# Patient Record
Sex: MALE | Race: BLACK OR AFRICAN AMERICAN | NOT HISPANIC OR LATINO | Employment: UNEMPLOYED | URBAN - METROPOLITAN AREA
[De-identification: names, ages, dates, MRNs, and addresses within clinical notes are randomized per-mention and may not be internally consistent; named-entity substitution may affect disease eponyms.]

---

## 2017-01-06 ENCOUNTER — OFFICE VISIT - HEALTHEAST (OUTPATIENT)
Dept: INTERNAL MEDICINE | Facility: CLINIC | Age: 62
End: 2017-01-06

## 2017-01-06 DIAGNOSIS — M54.12 CERVICAL RADICULOPATHY: ICD-10-CM

## 2017-01-06 DIAGNOSIS — Z72.0 TOBACCO ABUSE: ICD-10-CM

## 2017-01-06 DIAGNOSIS — M19.90 OSTEOARTHRITIS: ICD-10-CM

## 2017-01-06 DIAGNOSIS — B18.2 CHRONIC HEPATITIS C (H): ICD-10-CM

## 2017-01-06 DIAGNOSIS — Z00.00 ROUTINE GENERAL MEDICAL EXAMINATION AT A HEALTH CARE FACILITY: ICD-10-CM

## 2017-01-06 DIAGNOSIS — E11.9 TYPE 2 DIABETES MELLITUS (H): ICD-10-CM

## 2017-01-06 DIAGNOSIS — K21.9 GERD (GASTROESOPHAGEAL REFLUX DISEASE): ICD-10-CM

## 2017-01-06 DIAGNOSIS — R20.2 PARESTHESIA OF RIGHT FOOT: ICD-10-CM

## 2017-01-06 DIAGNOSIS — F32.A DEPRESSION: ICD-10-CM

## 2017-01-06 LAB
CHOLEST SERPL-MCNC: 234 MG/DL
FASTING STATUS PATIENT QL REPORTED: YES
HBA1C MFR BLD: 5.3 % (ref 3.5–6)
HDLC SERPL-MCNC: 76 MG/DL
LDLC SERPL CALC-MCNC: 135 MG/DL
PSA SERPL-MCNC: 5.2 NG/ML (ref 0–4.5)
TRIGL SERPL-MCNC: 113 MG/DL

## 2017-01-06 ASSESSMENT — MIFFLIN-ST. JEOR: SCORE: 1457.11

## 2017-01-09 ENCOUNTER — COMMUNICATION - HEALTHEAST (OUTPATIENT)
Dept: INTERNAL MEDICINE | Facility: CLINIC | Age: 62
End: 2017-01-09

## 2017-03-03 ENCOUNTER — COMMUNICATION - HEALTHEAST (OUTPATIENT)
Dept: INTERNAL MEDICINE | Facility: CLINIC | Age: 62
End: 2017-03-03

## 2017-03-09 ENCOUNTER — OFFICE VISIT - HEALTHEAST (OUTPATIENT)
Dept: INTERNAL MEDICINE | Facility: CLINIC | Age: 62
End: 2017-03-09

## 2017-03-09 DIAGNOSIS — M25.512 ACUTE PAIN OF LEFT SHOULDER: ICD-10-CM

## 2017-03-09 DIAGNOSIS — M79.604 RIGHT LEG PAIN: ICD-10-CM

## 2017-03-09 ASSESSMENT — MIFFLIN-ST. JEOR: SCORE: 1466.18

## 2017-03-17 ENCOUNTER — COMMUNICATION - HEALTHEAST (OUTPATIENT)
Dept: SCHEDULING | Facility: CLINIC | Age: 62
End: 2017-03-17

## 2017-03-17 ENCOUNTER — COMMUNICATION - HEALTHEAST (OUTPATIENT)
Dept: INTERNAL MEDICINE | Facility: CLINIC | Age: 62
End: 2017-03-17

## 2017-03-17 ENCOUNTER — AMBULATORY - HEALTHEAST (OUTPATIENT)
Dept: INTERNAL MEDICINE | Facility: CLINIC | Age: 62
End: 2017-03-17

## 2017-03-17 DIAGNOSIS — M79.604 RIGHT LEG PAIN: ICD-10-CM

## 2017-03-21 ENCOUNTER — HOSPITAL ENCOUNTER (OUTPATIENT)
Dept: MRI IMAGING | Facility: CLINIC | Age: 62
Discharge: HOME | End: 2017-03-21
Attending: INTERNAL MEDICINE

## 2017-03-21 DIAGNOSIS — M79.604 RIGHT LEG PAIN: ICD-10-CM

## 2017-03-23 ENCOUNTER — COMMUNICATION - HEALTHEAST (OUTPATIENT)
Dept: INTERNAL MEDICINE | Facility: CLINIC | Age: 62
End: 2017-03-23

## 2017-03-23 ENCOUNTER — AMBULATORY - HEALTHEAST (OUTPATIENT)
Dept: INTERNAL MEDICINE | Facility: CLINIC | Age: 62
End: 2017-03-23

## 2017-03-23 DIAGNOSIS — M79.604 RIGHT LEG PAIN: ICD-10-CM

## 2017-04-05 ENCOUNTER — OFFICE VISIT - HEALTHEAST (OUTPATIENT)
Dept: PHYSICAL THERAPY | Facility: CLINIC | Age: 62
End: 2017-04-05

## 2017-04-05 DIAGNOSIS — M79.604 RIGHT LEG PAIN: ICD-10-CM

## 2017-04-05 DIAGNOSIS — M54.16 LUMBAR RADICULOPATHY: ICD-10-CM

## 2017-04-18 ENCOUNTER — COMMUNICATION - HEALTHEAST (OUTPATIENT)
Dept: INTERNAL MEDICINE | Facility: CLINIC | Age: 62
End: 2017-04-18

## 2017-04-20 ENCOUNTER — OFFICE VISIT - HEALTHEAST (OUTPATIENT)
Dept: INTERNAL MEDICINE | Facility: CLINIC | Age: 62
End: 2017-04-20

## 2017-04-20 DIAGNOSIS — M54.16 ACUTE RIGHT LUMBAR RADICULOPATHY: ICD-10-CM

## 2017-04-20 ASSESSMENT — MIFFLIN-ST. JEOR: SCORE: 1470.72

## 2017-04-25 ENCOUNTER — OFFICE VISIT - HEALTHEAST (OUTPATIENT)
Dept: PHYSICAL THERAPY | Facility: CLINIC | Age: 62
End: 2017-04-25

## 2017-04-25 DIAGNOSIS — M79.604 RIGHT LEG PAIN: ICD-10-CM

## 2017-04-25 DIAGNOSIS — M54.16 ACUTE RIGHT LUMBAR RADICULOPATHY: ICD-10-CM

## 2017-04-25 DIAGNOSIS — M54.16 LUMBAR RADICULOPATHY: ICD-10-CM

## 2017-04-26 ENCOUNTER — COMMUNICATION - HEALTHEAST (OUTPATIENT)
Dept: SCHEDULING | Facility: CLINIC | Age: 62
End: 2017-04-26

## 2017-04-28 ENCOUNTER — RECORDS - HEALTHEAST (OUTPATIENT)
Dept: ADMINISTRATIVE | Facility: OTHER | Age: 62
End: 2017-04-28

## 2017-05-02 ENCOUNTER — COMMUNICATION - HEALTHEAST (OUTPATIENT)
Dept: PHYSICAL THERAPY | Facility: CLINIC | Age: 62
End: 2017-05-02

## 2017-05-09 ENCOUNTER — OFFICE VISIT - HEALTHEAST (OUTPATIENT)
Dept: PHYSICAL THERAPY | Facility: CLINIC | Age: 62
End: 2017-05-09

## 2017-05-09 DIAGNOSIS — M54.16 ACUTE RIGHT LUMBAR RADICULOPATHY: ICD-10-CM

## 2017-05-09 DIAGNOSIS — M54.16 LUMBAR RADICULOPATHY: ICD-10-CM

## 2017-05-09 DIAGNOSIS — M79.604 RIGHT LEG PAIN: ICD-10-CM

## 2017-05-16 ENCOUNTER — OFFICE VISIT - HEALTHEAST (OUTPATIENT)
Dept: PHYSICAL THERAPY | Facility: CLINIC | Age: 62
End: 2017-05-16

## 2017-05-16 DIAGNOSIS — M54.16 LUMBAR RADICULOPATHY: ICD-10-CM

## 2017-05-16 DIAGNOSIS — M79.604 RIGHT LEG PAIN: ICD-10-CM

## 2017-05-16 DIAGNOSIS — M54.16 ACUTE RIGHT LUMBAR RADICULOPATHY: ICD-10-CM

## 2017-06-01 ENCOUNTER — COMMUNICATION - HEALTHEAST (OUTPATIENT)
Dept: PHYSICAL THERAPY | Facility: CLINIC | Age: 62
End: 2017-06-01

## 2017-06-12 ENCOUNTER — OFFICE VISIT - HEALTHEAST (OUTPATIENT)
Dept: PHYSICAL THERAPY | Facility: CLINIC | Age: 62
End: 2017-06-12

## 2017-06-12 DIAGNOSIS — M54.16 LUMBAR RADICULOPATHY: ICD-10-CM

## 2017-06-12 DIAGNOSIS — M79.604 RIGHT LEG PAIN: ICD-10-CM

## 2017-07-06 ENCOUNTER — OFFICE VISIT - HEALTHEAST (OUTPATIENT)
Dept: INTERNAL MEDICINE | Facility: CLINIC | Age: 62
End: 2017-07-06

## 2017-07-06 DIAGNOSIS — M54.16 RIGHT LUMBAR RADICULOPATHY: ICD-10-CM

## 2017-07-06 DIAGNOSIS — E11.9 TYPE 2 DIABETES MELLITUS (H): ICD-10-CM

## 2017-07-06 DIAGNOSIS — R97.20 ELEVATED PSA: ICD-10-CM

## 2017-07-06 LAB
HBA1C MFR BLD: 5.5 % (ref 3.5–6)
PSA SERPL-MCNC: 4.5 NG/ML (ref 0–4.5)

## 2017-07-06 ASSESSMENT — MIFFLIN-ST. JEOR: SCORE: 1452.57

## 2017-07-08 ENCOUNTER — COMMUNICATION - HEALTHEAST (OUTPATIENT)
Dept: INTERNAL MEDICINE | Facility: CLINIC | Age: 62
End: 2017-07-08

## 2017-08-10 ENCOUNTER — COMMUNICATION - HEALTHEAST (OUTPATIENT)
Dept: INTERNAL MEDICINE | Facility: CLINIC | Age: 62
End: 2017-08-10

## 2017-08-11 ENCOUNTER — AMBULATORY - HEALTHEAST (OUTPATIENT)
Dept: INTERNAL MEDICINE | Facility: CLINIC | Age: 62
End: 2017-08-11

## 2017-10-27 ENCOUNTER — RECORDS - HEALTHEAST (OUTPATIENT)
Dept: ADMINISTRATIVE | Facility: OTHER | Age: 62
End: 2017-10-27

## 2017-10-27 ENCOUNTER — COMMUNICATION - HEALTHEAST (OUTPATIENT)
Dept: SCHEDULING | Facility: CLINIC | Age: 62
End: 2017-10-27

## 2017-10-27 ENCOUNTER — HOSPITAL ENCOUNTER (EMERGENCY)
Facility: CLINIC | Age: 62
Discharge: HOME | End: 2017-10-27
Attending: FAMILY MEDICINE | Admitting: FAMILY MEDICINE
Payer: COMMERCIAL

## 2017-10-27 VITALS
RESPIRATION RATE: 20 BRPM | SYSTOLIC BLOOD PRESSURE: 134 MMHG | OXYGEN SATURATION: 99 % | HEART RATE: 82 BPM | BODY MASS INDEX: 20.04 KG/M2 | TEMPERATURE: 98 F | DIASTOLIC BLOOD PRESSURE: 70 MMHG | HEIGHT: 70 IN | WEIGHT: 140 LBS

## 2017-10-27 DIAGNOSIS — R73.9 ELEVATED BLOOD SUGAR: ICD-10-CM

## 2017-10-27 LAB
ANION GAP SERPL CALCULATED.3IONS-SCNC: 6 MMOL/L (ref 3–14)
BASOPHILS # BLD AUTO: 0 10E9/L (ref 0–0.2)
BASOPHILS NFR BLD AUTO: 0.2 %
BUN SERPL-MCNC: 12 MG/DL (ref 7–30)
CALCIUM SERPL-MCNC: 9.4 MG/DL (ref 8.5–10.1)
CHLORIDE SERPL-SCNC: 105 MMOL/L (ref 94–109)
CO2 SERPL-SCNC: 27 MMOL/L (ref 20–32)
CREAT SERPL-MCNC: 0.8 MG/DL (ref 0.66–1.25)
DIFFERENTIAL METHOD BLD: ABNORMAL
EOSINOPHIL # BLD AUTO: 0 10E9/L (ref 0–0.7)
EOSINOPHIL NFR BLD AUTO: 0.8 %
ERYTHROCYTE [DISTWIDTH] IN BLOOD BY AUTOMATED COUNT: 13.5 % (ref 10–15)
GFR SERPL CREATININE-BSD FRML MDRD: >90 ML/MIN/1.7M2
GLUCOSE BLDC GLUCOMTR-MCNC: 129 MG/DL (ref 70–99)
GLUCOSE SERPL-MCNC: 91 MG/DL (ref 70–99)
HCT VFR BLD AUTO: 38.6 % (ref 40–53)
HGB BLD-MCNC: 13.5 G/DL (ref 13.3–17.7)
IMM GRANULOCYTES # BLD: 0 10E9/L (ref 0–0.4)
IMM GRANULOCYTES NFR BLD: 0.4 %
LYMPHOCYTES # BLD AUTO: 1.9 10E9/L (ref 0.8–5.3)
LYMPHOCYTES NFR BLD AUTO: 37.7 %
MCH RBC QN AUTO: 33.1 PG (ref 26.5–33)
MCHC RBC AUTO-ENTMCNC: 35 G/DL (ref 31.5–36.5)
MCV RBC AUTO: 95 FL (ref 78–100)
MONOCYTES # BLD AUTO: 0.4 10E9/L (ref 0–1.3)
MONOCYTES NFR BLD AUTO: 8.2 %
NEUTROPHILS # BLD AUTO: 2.6 10E9/L (ref 1.6–8.3)
NEUTROPHILS NFR BLD AUTO: 52.7 %
PLATELET # BLD AUTO: 222 10E9/L (ref 150–450)
POTASSIUM SERPL-SCNC: 3.9 MMOL/L (ref 3.4–5.3)
RBC # BLD AUTO: 4.08 10E12/L (ref 4.4–5.9)
SODIUM SERPL-SCNC: 138 MMOL/L (ref 133–144)
WBC # BLD AUTO: 5 10E9/L (ref 4–11)

## 2017-10-27 PROCEDURE — 80048 BASIC METABOLIC PNL TOTAL CA: CPT | Performed by: EMERGENCY MEDICINE

## 2017-10-27 PROCEDURE — 85025 COMPLETE CBC W/AUTO DIFF WBC: CPT | Performed by: EMERGENCY MEDICINE

## 2017-10-27 PROCEDURE — 00000146 ZZHCL STATISTIC GLUCOSE BY METER IP

## 2017-10-27 PROCEDURE — 99283 EMERGENCY DEPT VISIT LOW MDM: CPT | Mod: Z6 | Performed by: FAMILY MEDICINE

## 2017-10-27 PROCEDURE — 99283 EMERGENCY DEPT VISIT LOW MDM: CPT | Performed by: FAMILY MEDICINE

## 2017-10-27 ASSESSMENT — ENCOUNTER SYMPTOMS
SHORTNESS OF BREATH: 0
FEVER: 0
MUSCULOSKELETAL NEGATIVE: 1
PSYCHIATRIC NEGATIVE: 1
WEAKNESS: 0
VOMITING: 0
DIFFICULTY URINATING: 0
HEMATOLOGIC/LYMPHATIC NEGATIVE: 1
NAUSEA: 0
DIZZINESS: 0

## 2017-10-27 NOTE — ED AVS SNAPSHOT
[facility] Emergency Department    [address] [address]    Phone:  [phone]    Fax:  [phone]                                       Olu Osuna   MRN: [id number]    Department:  [facility] Emergency Department   Date of Visit:  10/27/2017           After Visit Summary Signature Page     I have received my discharge instructions, and my questions have been answered. I have discussed any challenges I see with this plan with the nurse or doctor.    ..........................................................................................................................................  Patient/Patient Representative Signature      ..........................................................................................................................................  Patient Representative Print Name and Relationship to Patient    ..................................................               ................................................  Date                                            Time    ..........................................................................................................................................  Reviewed by Signature/Title    ...................................................              ..............................................  Date                                                            Time

## 2017-10-27 NOTE — ED NOTES
Pt is diabetic pt's glucometer read over >600 today in triage our blood sugar got 129, pt denies symptoms, other than decreased appetite, labs are sent, pt has prediabetes

## 2017-10-27 NOTE — ED AVS SNAPSHOT
[facility]    [address] [address]    Phone:  [phone]    Fax:  [phone]                                       Olu Osuna   MRN: [id number]    Department:  [facility]   Date of Visit:  10/27/2017           Patient Information     Date Of Birth          1955        Your diagnoses for this visit were:     Elevated blood sugar        You were seen by Patrick Conti MD.        Discharge Instructions         Your blood sugars here were 129 and 91 which are in the normal range.    Try your fresh test strips.    See your Doctor if you have persistent abnormal readings.    24 Hour Appointment Hotline       To make an appointment at any [facility] clinic, call [phone] ([phone]). If you don't have a family doctor or clinic, we will help you find one. [facility] clinics are conveniently located to serve the needs of you and your family.             Review of your medicines      Our records show that you are taking the medicines listed below. If these are incorrect, please call your family doctor or clinic.        Dose / Directions Last dose taken    CELEBREX PO   Dose:  200 mg        Take 200 mg by mouth daily   Refills:  0        CENTRUM SILVER per tablet   Dose:  1 tablet        Take 1 tablet by mouth daily   Refills:  0        fluticasone 50 MCG/ACT spray   Commonly known as:  FLONASE   Dose:  2 spray        Spray 2 sprays into both nostrils daily as needed   Refills:  0        loratadine 10 MG tablet   Commonly known as:  CLARITIN   Dose:  10 mg        Take 10 mg by mouth daily   Refills:  0                Procedures and tests performed during your visit     Basic metabolic panel    CBC with platelets differential    Glucose by meter      Orders Needing Specimen Collection     None      Pending Results     No orders found from 10/25/2017 to 10/28/2017.            Pending Culture Results     No orders found from  10/25/2017 to 10/28/2017.            Pending Results Instructions     If you had any lab results that were not finalized at the time of your Discharge, you can call the ED Lab Result RN at [phone]. You will be contacted by this team for any positive Lab results or changes in treatment. The nurses are available 7 days a week from 10A to 6:30P.  You can leave a message 24 hours per day and they will return your call.        Test Results From Your Hospital Stay        10/27/2017 12:31 PM      Component Results     Component Value Ref Range & Units Status    Glucose 129 (H) 70 - 99 mg/dL Final         10/27/2017  2:16 PM      Component Results     Component Value Ref Range & Units Status    WBC 5.0 4.0 - 11.0 10e9/L Final    RBC Count 4.08 (L) 4.4 - 5.9 10e12/L Final    Hemoglobin 13.5 13.3 - 17.7 g/dL Final    Hematocrit 38.6 (L) 40.0 - 53.0 % Final    MCV 95 78 - 100 fl Final    MCH 33.1 (H) 26.5 - 33.0 pg Final    MCHC 35.0 31.5 - 36.5 g/dL Final    RDW 13.5 10.0 - 15.0 % Final    Platelet Count 222 150 - 450 10e9/L Final    Diff Method Automated Method  Final    % Neutrophils 52.7 % Final    % Lymphocytes 37.7 % Final    % Monocytes 8.2 % Final    % Eosinophils 0.8 % Final    % Basophils 0.2 % Final    % Immature Granulocytes 0.4 % Final    Absolute Neutrophil 2.6 1.6 - 8.3 10e9/L Final    Absolute Lymphocytes 1.9 0.8 - 5.3 10e9/L Final    Absolute Monocytes 0.4 0.0 - 1.3 10e9/L Final    Absolute Eosinophils 0.0 0.0 - 0.7 10e9/L Final    Absolute Basophils 0.0 0.0 - 0.2 10e9/L Final    Abs Immature Granulocytes 0.0 0 - 0.4 10e9/L Final         10/27/2017  2:06 PM      Component Results     Component Value Ref Range & Units Status    Sodium 138 133 - 144 mmol/L Final    Potassium 3.9 3.4 - 5.3 mmol/L Final    Chloride 105 94 - 109 mmol/L Final    Carbon Dioxide 27 20 - 32 mmol/L Final    Anion Gap 6 3 - 14 mmol/L Final    Glucose 91 70 - 99 mg/dL Final    Urea Nitrogen 12 7 - 30 mg/dL Final    Creatinine 0.80  "0.66 - 1.25 mg/dL Final    GFR Estimate >90 >60 mL/min/1.7m2 Final    Non  GFR Calc    GFR Estimate If Black >90 >60 mL/min/1.7m2 Final    African American GFR Calc    Calcium 9.4 8.5 - 10.1 mg/dL Final                Thank you for choosing [facility]       Thank you for choosing [facility] for your care. Our goal is always to provide you with excellent care. Hearing back from our patients is one way we can continue to improve our services. Please take a few minutes to complete the written survey that you may receive in the mail after you visit with us. Thank you!        [employer] Information     [employer] lets you send messages to your doctor, view your test results, renew your prescriptions, schedule appointments and more. To sign up, go to [email or web address] . Click on \"Log in\" on the left side of the screen, which will take you to the Welcome page. Then click on \"Sign up Now\" on the right side of the page.     You will be asked to enter the access code listed below, as well as some personal information. Please follow the directions to create your username and password.     Your access code is: [email or web address]  Expires: 2018  2:50 PM     Your access code will  in 90 days. If you need help or a new code, please call your [facility] clinic or [phone].        Care EveryWhere ID     This is your Care EveryWhere ID. This could be used by other organizations to access your [facility] medical records  [id number]        Equal Access to Services     [facility] JANNIE AH: Hadii aad ku hadasho Soomaali, waaxda luqadaha, qaybta kaalmada adeegyada, ryan ramírez . So [facility] [phone].    ATENCIÓN: Si habla español, tiene a pitts disposición servicios gratuitos de asistencia lingüística. Llame al [phone].    We comply with applicable federal civil rights laws and [place] laws. We do not discriminate on the basis of race, color, national origin, age, disability, sex, sexual " orientation, or gender identity.            After Visit Summary       This is your record. Keep this with you and show to your community pharmacist(s) and doctor(s) at your next visit.

## 2017-10-27 NOTE — ED PROVIDER NOTES
"  History     Chief Complaint   Patient presents with     Hyperglycemia     has been monitoring BS at home, critical high,  pt feeling tired, burning feet, blurry vision. decreased appetite     HPI  Olu Osuna is a 62 year old male who is concerned about elevated blood sugar checked at home on a One Touch meter. Patient was getting \"High\" readings and though they were over 500. In triage here a finger stick glu was 129, however.    Rock has a primary doctor in [facility], Dr Yusuf Bassett, who DX'd prediabetes. He saw an educator and obtained the meter.    He is not having polydipsia or obvious wt loss. He says he feels tired lately and has decreased motivation.      Problem List:    Patient Active Problem List    Diagnosis Date Noted     Spinal stenosis in cervical region 01/04/2016     Priority: Medium        Past Medical History:    Past Medical History:   Diagnosis Date     Arthritis      Cervical radiculopathy      Depression      Erectile dysfunction      GERD (gastroesophageal reflux disease)      Hepatitis      Tobacco use        Past Surgical History:    Past Surgical History:   Procedure Laterality Date     CL AFF SURGICAL PATHOLOGY      lipoma resection     COLONOSCOPY       FUSION CERVICAL ANTERIOR TWO LEVELS WITH BONE ALLOGRAFT N/A 1/4/2016    Procedure: FUSION CERVICAL ANTERIOR TWO LEVELS WITH BONE ALLOGRAFT;  Surgeon: Delfin Flores MD;  Location:  OR     GRAFT BONE FROM ILIAC CREST Left 1/4/2016    Procedure: GRAFT BONE FROM ILIAC CREST;  Surgeon: Delfin Flores MD;  Location:  OR     HEMORRHOID SURGERY       ORTHOPEDIC SURGERY  2013    bilateral total knee arthroplasty       Family History:    No family history on file.    Social History:  Marital Status:  Single [1]  Social History   Substance Use Topics     Smoking status: Current Every Day Smoker     Packs/day: 0.50     Years: 45.00     Types: Cigarettes     Smokeless tobacco: Not on file     Alcohol use Yes      " "Comment: 18 cans of beer per week        Medications:      Celecoxib (CELEBREX PO)   Multiple Vitamins-Minerals (CENTRUM SILVER) per tablet   fluticasone (FLONASE) 50 MCG/ACT nasal spray   loratadine (CLARITIN) 10 MG tablet         Review of Systems   Constitutional: Negative for fever.   HENT: Negative.    Eyes: Negative for visual disturbance.   Respiratory: Negative for shortness of breath.    Cardiovascular: Negative for chest pain.   Gastrointestinal: Negative for nausea and vomiting.   Genitourinary: Negative for difficulty urinating.   Musculoskeletal: Negative.    Skin: Negative.    Neurological: Negative for dizziness and weakness.   Hematological: Negative.    Psychiatric/Behavioral: Negative.        Physical Exam   BP: 136/73  Pulse: 82  Heart Rate: 82  Temp: 98  F (36.7  C)  Resp: 20  Height: 177.8 cm (5' 10\")  Weight: 63.5 kg (140 lb)  SpO2: 99 %      Physical Exam   Constitutional: He appears well-nourished. No distress.   HENT:   Head: Normocephalic.   Mouth/Throat: Oropharynx is clear and moist.   Eyes: Pupils are equal, round, and reactive to light. No scleral icterus.   Neck: No thyromegaly present.   Cardiovascular: Regular rhythm.    No murmur heard.  Pulmonary/Chest: Breath sounds normal. No respiratory distress.   Abdominal: He exhibits no distension.   Musculoskeletal: He exhibits no edema or tenderness.   Neurological: No cranial nerve deficit. Coordination normal.   Skin: No rash noted.   Psychiatric: He has a normal mood and affect.       ED Course     ED Course     Procedures               Critical Care time:  none               Labs Ordered and Resulted from Time of ED Arrival Up to the Time of Departure from the ED   GLUCOSE BY METER - Abnormal; Notable for the following:        Result Value    Glucose 129 (*)     All other components within normal limits   CBC WITH PLATELETS DIFFERENTIAL - Abnormal; Notable for the following:     RBC Count 4.08 (*)     Hematocrit 38.6 (*)     MCH 33.1 " (*)     All other components within normal limits   BASIC METABOLIC PANEL       Results for orders placed or performed during the hospital encounter of 10/27/17   Glucose by meter   Result Value Ref Range    Glucose 129 (H) 70 - 99 mg/dL   CBC with platelets differential   Result Value Ref Range    WBC 5.0 4.0 - 11.0 10e9/L    RBC Count 4.08 (L) 4.4 - 5.9 10e12/L    Hemoglobin 13.5 13.3 - 17.7 g/dL    Hematocrit 38.6 (L) 40.0 - 53.0 %    MCV 95 78 - 100 fl    MCH 33.1 (H) 26.5 - 33.0 pg    MCHC 35.0 31.5 - 36.5 g/dL    RDW 13.5 10.0 - 15.0 %    Platelet Count 222 150 - 450 10e9/L    Diff Method Automated Method     % Neutrophils 52.7 %    % Lymphocytes 37.7 %    % Monocytes 8.2 %    % Eosinophils 0.8 %    % Basophils 0.2 %    % Immature Granulocytes 0.4 %    Absolute Neutrophil 2.6 1.6 - 8.3 10e9/L    Absolute Lymphocytes 1.9 0.8 - 5.3 10e9/L    Absolute Monocytes 0.4 0.0 - 1.3 10e9/L    Absolute Eosinophils 0.0 0.0 - 0.7 10e9/L    Absolute Basophils 0.0 0.0 - 0.2 10e9/L    Abs Immature Granulocytes 0.0 0 - 0.4 10e9/L   Basic metabolic panel   Result Value Ref Range    Sodium 138 133 - 144 mmol/L    Potassium 3.9 3.4 - 5.3 mmol/L    Chloride 105 94 - 109 mmol/L    Carbon Dioxide 27 20 - 32 mmol/L    Anion Gap 6 3 - 14 mmol/L    Glucose 91 70 - 99 mg/dL    Urea Nitrogen 12 7 - 30 mg/dL    Creatinine 0.80 0.66 - 1.25 mg/dL    GFR Estimate >90 >60 mL/min/1.7m2    GFR Estimate If Black >90 >60 mL/min/1.7m2    Calcium 9.4 8.5 - 10.1 mg/dL         Assessments & Plan (with Medical Decision Making)     This patient was concerned about dangerously high blood sugar. We did reassure him that our readings were 129 and 91. Nurse noted here that he was not keeping his test strips in the bottle, rather kept them in I small pocket with netting. This may be a facotr and he will try a fresh bottle of strips. O/W he is to see his doctor. He voices understanding of recommendations.        I have reviewed the nursing notes.    I have  reviewed the findings, diagnosis, plan and need for follow up with the patient.       Discharge Medication List as of 10/27/2017  2:50 PM          Final diagnoses:   Elevated blood sugar       10/27/2017   [facility] EMERGENCY DEPARTMENT     Patrick Conti MD  10/27/17 7029

## 2017-10-27 NOTE — DISCHARGE INSTRUCTIONS
Your blood sugars here were 129 and 91 which are in the normal range.    Try your fresh test strips.    See your Doctor if you have persistent abnormal readings.

## 2017-10-31 ENCOUNTER — COMMUNICATION - HEALTHEAST (OUTPATIENT)
Dept: INTERNAL MEDICINE | Facility: CLINIC | Age: 62
End: 2017-10-31

## 2017-10-31 DIAGNOSIS — E11.9 TYPE 2 DIABETES MELLITUS (H): ICD-10-CM

## 2017-12-01 ENCOUNTER — COMMUNICATION - HEALTHEAST (OUTPATIENT)
Dept: INTERNAL MEDICINE | Facility: CLINIC | Age: 62
End: 2017-12-01

## 2017-12-01 ENCOUNTER — HOSPITAL ENCOUNTER (OUTPATIENT)
Dept: MRI IMAGING | Facility: CLINIC | Age: 62
Discharge: HOME | End: 2017-12-01
Attending: ORTHOPAEDIC SURGERY | Admitting: ORTHOPAEDIC SURGERY
Payer: COMMERCIAL

## 2017-12-01 DIAGNOSIS — M79.604 PAIN OF RIGHT LOWER EXTREMITY: ICD-10-CM

## 2017-12-01 DIAGNOSIS — M51.26 LUMBAR HERNIATED DISC: ICD-10-CM

## 2017-12-01 PROCEDURE — 72148 MRI LUMBAR SPINE W/O DYE: CPT

## 2017-12-04 ENCOUNTER — RECORDS - HEALTHEAST (OUTPATIENT)
Dept: ADMINISTRATIVE | Facility: OTHER | Age: 62
End: 2017-12-04

## 2018-01-01 ENCOUNTER — RECORDS - HEALTHEAST (OUTPATIENT)
Dept: ADMINISTRATIVE | Facility: OTHER | Age: 63
End: 2018-01-01

## 2018-01-01 ENCOUNTER — OFFICE VISIT - HEALTHEAST (OUTPATIENT)
Dept: INTERNAL MEDICINE | Facility: CLINIC | Age: 63
End: 2018-01-01

## 2018-01-01 ENCOUNTER — COMMUNICATION - HEALTHEAST (OUTPATIENT)
Dept: INTERNAL MEDICINE | Facility: CLINIC | Age: 63
End: 2018-01-01

## 2018-01-01 DIAGNOSIS — K21.00 GERD WITH ESOPHAGITIS: ICD-10-CM

## 2018-01-01 DIAGNOSIS — M54.16 RIGHT LUMBAR RADICULOPATHY: ICD-10-CM

## 2018-01-01 DIAGNOSIS — Z72.0 TOBACCO ABUSE: ICD-10-CM

## 2018-01-01 DIAGNOSIS — L30.9 DERMATITIS: ICD-10-CM

## 2018-01-01 ASSESSMENT — MIFFLIN-ST. JEOR: SCORE: 1479.79

## 2018-01-08 ENCOUNTER — COMMUNICATION - HEALTHEAST (OUTPATIENT)
Dept: INTERNAL MEDICINE | Facility: CLINIC | Age: 63
End: 2018-01-08

## 2018-01-09 ENCOUNTER — AMBULATORY - HEALTHEAST (OUTPATIENT)
Dept: INTERNAL MEDICINE | Facility: CLINIC | Age: 63
End: 2018-01-09

## 2018-01-09 ENCOUNTER — OFFICE VISIT - HEALTHEAST (OUTPATIENT)
Dept: INTERNAL MEDICINE | Facility: CLINIC | Age: 63
End: 2018-01-09

## 2018-01-09 DIAGNOSIS — Z00.00 ROUTINE GENERAL MEDICAL EXAMINATION AT A HEALTH CARE FACILITY: ICD-10-CM

## 2018-01-09 DIAGNOSIS — F32.A DEPRESSION: ICD-10-CM

## 2018-01-09 DIAGNOSIS — Z72.0 TOBACCO ABUSE: ICD-10-CM

## 2018-01-09 DIAGNOSIS — B18.2 CHRONIC HEPATITIS C (H): ICD-10-CM

## 2018-01-09 DIAGNOSIS — M17.0 OSTEOARTHRITIS OF BOTH KNEES: ICD-10-CM

## 2018-01-09 DIAGNOSIS — M54.16 RIGHT LUMBAR RADICULOPATHY: ICD-10-CM

## 2018-01-09 DIAGNOSIS — E11.9 TYPE 2 DIABETES MELLITUS (H): ICD-10-CM

## 2018-01-09 DIAGNOSIS — K21.9 GERD (GASTROESOPHAGEAL REFLUX DISEASE): ICD-10-CM

## 2018-01-09 LAB
ALBUMIN SERPL-MCNC: 4 G/DL (ref 3.5–5)
ALP SERPL-CCNC: 68 U/L (ref 45–120)
ALT SERPL W P-5'-P-CCNC: 24 U/L (ref 0–45)
ANION GAP SERPL CALCULATED.3IONS-SCNC: 11 MMOL/L (ref 5–18)
AST SERPL W P-5'-P-CCNC: 20 U/L (ref 0–40)
BILIRUB DIRECT SERPL-MCNC: 0.2 MG/DL
BILIRUB SERPL-MCNC: 0.5 MG/DL (ref 0–1)
BUN SERPL-MCNC: 15 MG/DL (ref 8–22)
CALCIUM SERPL-MCNC: 9.8 MG/DL (ref 8.5–10.5)
CHLORIDE BLD-SCNC: 104 MMOL/L (ref 98–107)
CO2 SERPL-SCNC: 24 MMOL/L (ref 22–31)
CREAT SERPL-MCNC: 0.8 MG/DL (ref 0.7–1.3)
CREAT UR-MCNC: 163.5 MG/DL
GFR SERPL CREATININE-BSD FRML MDRD: >60 ML/MIN/1.73M2
GLUCOSE BLD-MCNC: 95 MG/DL (ref 70–125)
HBA1C MFR BLD: 5.7 % (ref 3.5–6)
HGB BLD-MCNC: 13.9 G/DL (ref 14–18)
MICROALBUMIN UR-MCNC: 0.83 MG/DL (ref 0–1.99)
MICROALBUMIN/CREAT UR: 5.1 MG/G
POTASSIUM BLD-SCNC: 3.8 MMOL/L (ref 3.5–5)
PROT SERPL-MCNC: 8.3 G/DL (ref 6–8)
PSA SERPL-MCNC: 5.2 NG/ML (ref 0–4.5)
SODIUM SERPL-SCNC: 139 MMOL/L (ref 136–145)

## 2018-01-09 ASSESSMENT — MIFFLIN-ST. JEOR: SCORE: 1493.4

## 2018-01-11 ENCOUNTER — COMMUNICATION - HEALTHEAST (OUTPATIENT)
Dept: INTERNAL MEDICINE | Facility: CLINIC | Age: 63
End: 2018-01-11

## 2018-01-15 ENCOUNTER — RECORDS - HEALTHEAST (OUTPATIENT)
Dept: ADMINISTRATIVE | Facility: OTHER | Age: 63
End: 2018-01-15

## 2018-01-16 ENCOUNTER — RECORDS - HEALTHEAST (OUTPATIENT)
Dept: ADMINISTRATIVE | Facility: OTHER | Age: 63
End: 2018-01-16

## 2018-02-02 ENCOUNTER — COMMUNICATION - HEALTHEAST (OUTPATIENT)
Dept: INTERNAL MEDICINE | Facility: CLINIC | Age: 63
End: 2018-02-02

## 2018-02-02 DIAGNOSIS — E11.9 TYPE 2 DIABETES MELLITUS (H): ICD-10-CM

## 2018-03-13 ENCOUNTER — RECORDS - HEALTHEAST (OUTPATIENT)
Dept: ADMINISTRATIVE | Facility: OTHER | Age: 63
End: 2018-03-13

## 2018-03-15 ENCOUNTER — COMMUNICATION - HEALTHEAST (OUTPATIENT)
Dept: INTERNAL MEDICINE | Facility: CLINIC | Age: 63
End: 2018-03-15

## 2018-03-20 ENCOUNTER — HOSPITAL ENCOUNTER (OUTPATIENT)
Dept: MRI IMAGING | Facility: CLINIC | Age: 63
Discharge: HOME | End: 2018-03-20
Attending: ORTHOPAEDIC SURGERY | Admitting: ORTHOPAEDIC SURGERY
Payer: COMMERCIAL

## 2018-03-20 DIAGNOSIS — M54.16 LUMBAR RADICULOPATHY: ICD-10-CM

## 2018-03-20 PROCEDURE — 72148 MRI LUMBAR SPINE W/O DYE: CPT

## 2018-03-22 ENCOUNTER — OFFICE VISIT - HEALTHEAST (OUTPATIENT)
Dept: INTERNAL MEDICINE | Facility: CLINIC | Age: 63
End: 2018-03-22

## 2018-03-22 DIAGNOSIS — Z72.0 TOBACCO ABUSE: ICD-10-CM

## 2018-03-22 DIAGNOSIS — E11.9 TYPE 2 DIABETES MELLITUS (H): ICD-10-CM

## 2018-03-22 DIAGNOSIS — Z01.818 ENCOUNTER FOR PREOPERATIVE EXAMINATION FOR GENERAL SURGICAL PROCEDURE: ICD-10-CM

## 2018-03-22 DIAGNOSIS — M54.16 RIGHT LUMBAR RADICULOPATHY: ICD-10-CM

## 2018-03-22 LAB
ERYTHROCYTE [DISTWIDTH] IN BLOOD BY AUTOMATED COUNT: 11.9 % (ref 11–14.5)
HCT VFR BLD AUTO: 41.2 % (ref 40–54)
HGB BLD-MCNC: 13.3 G/DL (ref 14–18)
INR PPP: 1 (ref 0.9–1.1)
MCH RBC QN AUTO: 32.5 PG (ref 27–34)
MCHC RBC AUTO-ENTMCNC: 32.2 G/DL (ref 32–36)
MCV RBC AUTO: 101 FL (ref 80–100)
PLATELET # BLD AUTO: 221 THOU/UL (ref 140–440)
PMV BLD AUTO: 6.7 FL (ref 7–10)
RBC # BLD AUTO: 4.09 MILL/UL (ref 4.4–6.2)
WBC: 4.9 THOU/UL (ref 4–11)

## 2018-03-22 ASSESSMENT — MIFFLIN-ST. JEOR: SCORE: 1466.18

## 2018-03-23 LAB
ANION GAP SERPL CALCULATED.3IONS-SCNC: 13 MMOL/L (ref 5–18)
ATRIAL RATE - MUSE: 73 BPM
BUN SERPL-MCNC: 11 MG/DL (ref 8–22)
CALCIUM SERPL-MCNC: 9.6 MG/DL (ref 8.5–10.5)
CHLORIDE BLD-SCNC: 103 MMOL/L (ref 98–107)
CO2 SERPL-SCNC: 21 MMOL/L (ref 22–31)
CREAT SERPL-MCNC: 0.76 MG/DL (ref 0.7–1.3)
DIASTOLIC BLOOD PRESSURE - MUSE: NORMAL MMHG
GFR SERPL CREATININE-BSD FRML MDRD: >60 ML/MIN/1.73M2
GLUCOSE BLD-MCNC: 84 MG/DL (ref 70–125)
INTERPRETATION ECG - MUSE: NORMAL
P AXIS - MUSE: 72 DEGREES
POTASSIUM BLD-SCNC: 3.8 MMOL/L (ref 3.5–5)
PR INTERVAL - MUSE: 148 MS
QRS DURATION - MUSE: 92 MS
QT - MUSE: 390 MS
QTC - MUSE: 429 MS
R AXIS - MUSE: -25 DEGREES
SODIUM SERPL-SCNC: 137 MMOL/L (ref 136–145)
SYSTOLIC BLOOD PRESSURE - MUSE: NORMAL MMHG
T AXIS - MUSE: 18 DEGREES
VENTRICULAR RATE- MUSE: 73 BPM

## 2018-04-09 ENCOUNTER — RECORDS - HEALTHEAST (OUTPATIENT)
Dept: ADMINISTRATIVE | Facility: OTHER | Age: 63
End: 2018-04-09

## 2018-04-30 ENCOUNTER — RECORDS - HEALTHEAST (OUTPATIENT)
Dept: ADMINISTRATIVE | Facility: OTHER | Age: 63
End: 2018-04-30

## 2018-07-09 ENCOUNTER — OFFICE VISIT - HEALTHEAST (OUTPATIENT)
Dept: INTERNAL MEDICINE | Facility: CLINIC | Age: 63
End: 2018-07-09

## 2018-07-09 DIAGNOSIS — M15.0 PRIMARY OSTEOARTHRITIS INVOLVING MULTIPLE JOINTS: ICD-10-CM

## 2018-07-09 DIAGNOSIS — E11.9 TYPE 2 DIABETES MELLITUS (H): ICD-10-CM

## 2018-07-09 DIAGNOSIS — M54.16 RIGHT LUMBAR RADICULOPATHY: ICD-10-CM

## 2018-07-09 DIAGNOSIS — Z72.0 TOBACCO ABUSE: ICD-10-CM

## 2018-07-09 LAB — HBA1C MFR BLD: 5.6 % (ref 3.5–6)

## 2018-07-09 ASSESSMENT — MIFFLIN-ST. JEOR: SCORE: 1466.18

## 2018-07-10 ENCOUNTER — COMMUNICATION - HEALTHEAST (OUTPATIENT)
Dept: INTERNAL MEDICINE | Facility: CLINIC | Age: 63
End: 2018-07-10

## 2018-07-16 ENCOUNTER — HOSPITAL ENCOUNTER (OUTPATIENT)
Dept: MRI IMAGING | Facility: CLINIC | Age: 63
Discharge: HOME | End: 2018-07-16
Attending: INTERNAL MEDICINE

## 2018-07-16 DIAGNOSIS — M54.16 RIGHT LUMBAR RADICULOPATHY: ICD-10-CM

## 2018-08-08 ENCOUNTER — COMMUNICATION - HEALTHEAST (OUTPATIENT)
Dept: INTERNAL MEDICINE | Facility: CLINIC | Age: 63
End: 2018-08-08

## 2018-08-08 DIAGNOSIS — E11.9 TYPE 2 DIABETES MELLITUS (H): ICD-10-CM

## 2018-08-14 ENCOUNTER — RECORDS - HEALTHEAST (OUTPATIENT)
Dept: ADMINISTRATIVE | Facility: OTHER | Age: 63
End: 2018-08-14

## 2018-09-04 ENCOUNTER — RECORDS - HEALTHEAST (OUTPATIENT)
Dept: ADMINISTRATIVE | Facility: OTHER | Age: 63
End: 2018-09-04

## 2018-09-26 ENCOUNTER — OFFICE VISIT - HEALTHEAST (OUTPATIENT)
Dept: INTERNAL MEDICINE | Facility: CLINIC | Age: 63
End: 2018-09-26

## 2018-09-26 ENCOUNTER — COMMUNICATION - HEALTHEAST (OUTPATIENT)
Dept: INTERNAL MEDICINE | Facility: CLINIC | Age: 63
End: 2018-09-26

## 2018-09-26 DIAGNOSIS — F33.42 RECURRENT MAJOR DEPRESSIVE DISORDER, IN FULL REMISSION (H): ICD-10-CM

## 2018-09-26 DIAGNOSIS — Z01.818 PRE-OPERATIVE EXAMINATION: ICD-10-CM

## 2018-09-26 DIAGNOSIS — R97.20 ELEVATED PSA: ICD-10-CM

## 2018-09-26 DIAGNOSIS — Z72.0 TOBACCO ABUSE: ICD-10-CM

## 2018-09-26 DIAGNOSIS — B18.2 CHRONIC HEPATITIS C WITHOUT HEPATIC COMA (H): ICD-10-CM

## 2018-09-26 DIAGNOSIS — M47.16 LUMBAR SPONDYLOSIS WITH MYELOPATHY: ICD-10-CM

## 2018-09-26 LAB
ANION GAP SERPL CALCULATED.3IONS-SCNC: 11 MMOL/L (ref 5–18)
ATRIAL RATE - MUSE: 74 BPM
BUN SERPL-MCNC: 10 MG/DL (ref 8–22)
CALCIUM SERPL-MCNC: 9.9 MG/DL (ref 8.5–10.5)
CHLORIDE BLD-SCNC: 106 MMOL/L (ref 98–107)
CO2 SERPL-SCNC: 23 MMOL/L (ref 22–31)
CREAT SERPL-MCNC: 0.75 MG/DL (ref 0.7–1.3)
DIASTOLIC BLOOD PRESSURE - MUSE: NORMAL MMHG
ERYTHROCYTE [DISTWIDTH] IN BLOOD BY AUTOMATED COUNT: 12 % (ref 11–14.5)
GFR SERPL CREATININE-BSD FRML MDRD: >60 ML/MIN/1.73M2
GLUCOSE BLD-MCNC: 95 MG/DL (ref 70–125)
HCT VFR BLD AUTO: 41.9 % (ref 40–54)
HGB BLD-MCNC: 14 G/DL (ref 14–18)
INR PPP: 1 (ref 0.9–1.1)
INTERPRETATION ECG - MUSE: NORMAL
MCH RBC QN AUTO: 33.6 PG (ref 27–34)
MCHC RBC AUTO-ENTMCNC: 33.3 G/DL (ref 32–36)
MCV RBC AUTO: 101 FL (ref 80–100)
P AXIS - MUSE: 69 DEGREES
PLATELET # BLD AUTO: 261 THOU/UL (ref 140–440)
PMV BLD AUTO: 7 FL (ref 7–10)
POTASSIUM BLD-SCNC: 4 MMOL/L (ref 3.5–5)
PR INTERVAL - MUSE: 152 MS
QRS DURATION - MUSE: 84 MS
QT - MUSE: 394 MS
QTC - MUSE: 437 MS
R AXIS - MUSE: -22 DEGREES
RBC # BLD AUTO: 4.15 MILL/UL (ref 4.4–6.2)
SODIUM SERPL-SCNC: 140 MMOL/L (ref 136–145)
SYSTOLIC BLOOD PRESSURE - MUSE: NORMAL MMHG
T AXIS - MUSE: 14 DEGREES
VENTRICULAR RATE- MUSE: 74 BPM
WBC: 4.4 THOU/UL (ref 4–11)

## 2018-09-26 ASSESSMENT — MIFFLIN-ST. JEOR: SCORE: 1466.18

## 2018-09-27 ENCOUNTER — COMMUNICATION - HEALTHEAST (OUTPATIENT)
Dept: INTERNAL MEDICINE | Facility: CLINIC | Age: 63
End: 2018-09-27

## 2018-10-05 ENCOUNTER — AMBULATORY - HEALTHEAST (OUTPATIENT)
Dept: INTERNAL MEDICINE | Facility: CLINIC | Age: 63
End: 2018-10-05

## 2018-10-05 ENCOUNTER — COMMUNICATION - HEALTHEAST (OUTPATIENT)
Dept: INTERNAL MEDICINE | Facility: CLINIC | Age: 63
End: 2018-10-05

## 2018-10-07 ENCOUNTER — ANESTHESIA EVENT (OUTPATIENT)
Dept: SURGERY | Facility: CLINIC | Age: 63
DRG: 460 | End: 2018-10-07
Payer: COMMERCIAL

## 2018-10-08 ENCOUNTER — ANESTHESIA (OUTPATIENT)
Dept: SURGERY | Facility: CLINIC | Age: 63
DRG: 460 | End: 2018-10-08
Payer: COMMERCIAL

## 2018-10-08 ENCOUNTER — RECORDS - HEALTHEAST (OUTPATIENT)
Dept: ADMINISTRATIVE | Facility: OTHER | Age: 63
End: 2018-10-08

## 2018-10-08 ENCOUNTER — HOSPITAL ENCOUNTER (INPATIENT)
Facility: CLINIC | Age: 63
LOS: 4 days | Discharge: HOME | DRG: 460 | End: 2018-10-12
Attending: ORTHOPAEDIC SURGERY | Admitting: ORTHOPAEDIC SURGERY
Payer: COMMERCIAL

## 2018-10-08 ENCOUNTER — APPOINTMENT (OUTPATIENT)
Dept: GENERAL RADIOLOGY | Facility: CLINIC | Age: 63
DRG: 460 | End: 2018-10-08
Attending: ORTHOPAEDIC SURGERY
Payer: COMMERCIAL

## 2018-10-08 DIAGNOSIS — M71.30 SYNOVIAL CYST: Primary | ICD-10-CM

## 2018-10-08 DIAGNOSIS — Z98.1 STATUS POST ARTHRODESIS: ICD-10-CM

## 2018-10-08 LAB
ABO + RH BLD: NORMAL
ABO + RH BLD: NORMAL
BLD GP AB SCN SERPL QL: NORMAL
BLOOD BANK CMNT PATIENT-IMP: NORMAL
GLUCOSE BLDC GLUCOMTR-MCNC: 143 MG/DL (ref 70–99)
SPECIMEN EXP DATE BLD: NORMAL

## 2018-10-08 PROCEDURE — 27210794 ZZH OR GENERAL SUPPLY STERILE: Performed by: ORTHOPAEDIC SURGERY

## 2018-10-08 PROCEDURE — 25000128 H RX IP 250 OP 636: Performed by: PHYSICIAN ASSISTANT

## 2018-10-08 PROCEDURE — 36000071 ZZH SURGERY LEVEL 5 W FLUORO 1ST 30 MIN: Performed by: ORTHOPAEDIC SURGERY

## 2018-10-08 PROCEDURE — 25000125 ZZHC RX 250: Performed by: ANESTHESIOLOGY

## 2018-10-08 PROCEDURE — 25000128 H RX IP 250 OP 636: Performed by: NURSE ANESTHETIST, CERTIFIED REGISTERED

## 2018-10-08 PROCEDURE — 00000146 ZZHCL STATISTIC GLUCOSE BY METER IP

## 2018-10-08 PROCEDURE — 37000009 ZZH ANESTHESIA TECHNICAL FEE, EACH ADDTL 15 MIN: Performed by: ORTHOPAEDIC SURGERY

## 2018-10-08 PROCEDURE — 40000277 XR SURGERY CARM FLUORO LESS THAN 5 MIN W STILLS

## 2018-10-08 PROCEDURE — L0625 LO FLEX L1-BELOW L5 PRE OTS: HCPCS

## 2018-10-08 PROCEDURE — 25000128 H RX IP 250 OP 636: Performed by: ORTHOPAEDIC SURGERY

## 2018-10-08 PROCEDURE — 40000169 ZZH STATISTIC PRE-PROCEDURE ASSESSMENT I: Performed by: ORTHOPAEDIC SURGERY

## 2018-10-08 PROCEDURE — 12000000 ZZH R&B MED SURG/OB

## 2018-10-08 PROCEDURE — 0SB30ZZ EXCISION OF LUMBOSACRAL JOINT, OPEN APPROACH: ICD-10-PCS | Performed by: ORTHOPAEDIC SURGERY

## 2018-10-08 PROCEDURE — 86900 BLOOD TYPING SEROLOGIC ABO: CPT | Performed by: ANESTHESIOLOGY

## 2018-10-08 PROCEDURE — 25000128 H RX IP 250 OP 636: Performed by: ANESTHESIOLOGY

## 2018-10-08 PROCEDURE — 25000125 ZZHC RX 250: Performed by: ORTHOPAEDIC SURGERY

## 2018-10-08 PROCEDURE — 86850 RBC ANTIBODY SCREEN: CPT | Performed by: ANESTHESIOLOGY

## 2018-10-08 PROCEDURE — C1713 ANCHOR/SCREW BN/BN,TIS/BN: HCPCS | Performed by: ORTHOPAEDIC SURGERY

## 2018-10-08 PROCEDURE — 25000566 ZZH SEVOFLURANE, EA 15 MIN: Performed by: ORTHOPAEDIC SURGERY

## 2018-10-08 PROCEDURE — 71000013 ZZH RECOVERY PHASE 1 LEVEL 1 EA ADDTL HR: Performed by: ORTHOPAEDIC SURGERY

## 2018-10-08 PROCEDURE — 36000069 ZZH SURGERY LEVEL 5 EA 15 ADDTL MIN: Performed by: ORTHOPAEDIC SURGERY

## 2018-10-08 PROCEDURE — 25000125 ZZHC RX 250: Performed by: PHYSICIAN ASSISTANT

## 2018-10-08 PROCEDURE — 86901 BLOOD TYPING SEROLOGIC RH(D): CPT | Performed by: ANESTHESIOLOGY

## 2018-10-08 PROCEDURE — 0QB20ZZ EXCISION OF RIGHT PELVIC BONE, OPEN APPROACH: ICD-10-PCS | Performed by: ORTHOPAEDIC SURGERY

## 2018-10-08 PROCEDURE — 36415 COLL VENOUS BLD VENIPUNCTURE: CPT | Performed by: ANESTHESIOLOGY

## 2018-10-08 PROCEDURE — 0SG3071 FUSION OF LUMBOSACRAL JOINT WITH AUTOLOGOUS TISSUE SUBSTITUTE, POSTERIOR APPROACH, POSTERIOR COLUMN, OPEN APPROACH: ICD-10-PCS | Performed by: ORTHOPAEDIC SURGERY

## 2018-10-08 PROCEDURE — 25000300 ZZH OR RX SURGIFLO HEMOSTATIC MATRIX 10ML 199102S OPNP: Performed by: ORTHOPAEDIC SURGERY

## 2018-10-08 PROCEDURE — 37000008 ZZH ANESTHESIA TECHNICAL FEE, 1ST 30 MIN: Performed by: ORTHOPAEDIC SURGERY

## 2018-10-08 PROCEDURE — 71000012 ZZH RECOVERY PHASE 1 LEVEL 1 FIRST HR: Performed by: ORTHOPAEDIC SURGERY

## 2018-10-08 PROCEDURE — 25000125 ZZHC RX 250: Performed by: NURSE ANESTHETIST, CERTIFIED REGISTERED

## 2018-10-08 DEVICE — IMP SCR MEDT TSRH 3DX OG THIN 6.5X40MM TI 83565409: Type: IMPLANTABLE DEVICE | Site: SPINE LUMBAR | Status: FUNCTIONAL

## 2018-10-08 DEVICE — IMP SCR MEDT TSRH 3DX OG THIN 6.5X45MM TI 83565459: Type: IMPLANTABLE DEVICE | Site: SPINE LUMBAR | Status: FUNCTIONAL

## 2018-10-08 DEVICE — IMP ROD MEDT TSRH PREBENT 03.5CM 8370035: Type: IMPLANTABLE DEVICE | Site: SPINE LUMBAR | Status: FUNCTIONAL

## 2018-10-08 DEVICE — IMP CONNECTOR MEDT TSRH 3D SM 8379120: Type: IMPLANTABLE DEVICE | Site: SPINE LUMBAR | Status: FUNCTIONAL

## 2018-10-08 DEVICE — IMP SCR SET MEDT TSRH 3D DBL HEX 8281246: Type: IMPLANTABLE DEVICE | Site: SPINE LUMBAR | Status: FUNCTIONAL

## 2018-10-08 RX ORDER — BISACODYL 5 MG
15 TABLET, DELAYED RELEASE (ENTERIC COATED) ORAL DAILY PRN
Status: DISCONTINUED | OUTPATIENT
Start: 2018-10-08 | End: 2018-10-12 | Stop reason: HOSPADM

## 2018-10-08 RX ORDER — LIDOCAINE 40 MG/G
CREAM TOPICAL
Status: DISCONTINUED | OUTPATIENT
Start: 2018-10-08 | End: 2018-10-12 | Stop reason: HOSPADM

## 2018-10-08 RX ORDER — ONDANSETRON 2 MG/ML
INJECTION INTRAMUSCULAR; INTRAVENOUS PRN
Status: DISCONTINUED | OUTPATIENT
Start: 2018-10-08 | End: 2018-10-08

## 2018-10-08 RX ORDER — FLUTICASONE PROPIONATE 50 MCG
2 SPRAY, SUSPENSION (ML) NASAL DAILY PRN
Status: DISCONTINUED | OUTPATIENT
Start: 2018-10-08 | End: 2018-10-12 | Stop reason: HOSPADM

## 2018-10-08 RX ORDER — POLYETHYLENE GLYCOL 3350 17 G/17G
17 POWDER, FOR SOLUTION ORAL DAILY PRN
Status: DISCONTINUED | OUTPATIENT
Start: 2018-10-08 | End: 2018-10-12 | Stop reason: HOSPADM

## 2018-10-08 RX ORDER — SODIUM CHLORIDE, SODIUM LACTATE, POTASSIUM CHLORIDE, CALCIUM CHLORIDE 600; 310; 30; 20 MG/100ML; MG/100ML; MG/100ML; MG/100ML
INJECTION, SOLUTION INTRAVENOUS CONTINUOUS
Status: DISCONTINUED | OUTPATIENT
Start: 2018-10-08 | End: 2018-10-08 | Stop reason: HOSPADM

## 2018-10-08 RX ORDER — SODIUM CHLORIDE 9 MG/ML
INJECTION, SOLUTION INTRAVENOUS CONTINUOUS
Status: DISCONTINUED | OUTPATIENT
Start: 2018-10-08 | End: 2018-10-11

## 2018-10-08 RX ORDER — ONDANSETRON 4 MG/1
4 TABLET, ORALLY DISINTEGRATING ORAL EVERY 30 MIN PRN
Status: DISCONTINUED | OUTPATIENT
Start: 2018-10-08 | End: 2018-10-08 | Stop reason: HOSPADM

## 2018-10-08 RX ORDER — TRIAMCINOLONE ACETONIDE 1 MG/G
CREAM TOPICAL 2 TIMES DAILY PRN
COMMUNITY

## 2018-10-08 RX ORDER — NALOXONE HYDROCHLORIDE 0.4 MG/ML
.1-.4 INJECTION, SOLUTION INTRAMUSCULAR; INTRAVENOUS; SUBCUTANEOUS
Status: DISCONTINUED | OUTPATIENT
Start: 2018-10-08 | End: 2018-10-08

## 2018-10-08 RX ORDER — AMOXICILLIN 250 MG
2 CAPSULE ORAL 2 TIMES DAILY
Status: DISCONTINUED | OUTPATIENT
Start: 2018-10-08 | End: 2018-10-12 | Stop reason: HOSPADM

## 2018-10-08 RX ORDER — MAGNESIUM CARB/ALUMINUM HYDROX 105-160MG
148 TABLET,CHEWABLE ORAL ONCE
Status: DISCONTINUED | OUTPATIENT
Start: 2018-10-08 | End: 2018-10-12 | Stop reason: HOSPADM

## 2018-10-08 RX ORDER — BISACODYL 5 MG
10 TABLET, DELAYED RELEASE (ENTERIC COATED) ORAL DAILY PRN
Status: DISCONTINUED | OUTPATIENT
Start: 2018-10-08 | End: 2018-10-12 | Stop reason: HOSPADM

## 2018-10-08 RX ORDER — FENTANYL CITRATE 50 UG/ML
INJECTION, SOLUTION INTRAMUSCULAR; INTRAVENOUS PRN
Status: DISCONTINUED | OUTPATIENT
Start: 2018-10-08 | End: 2018-10-08

## 2018-10-08 RX ORDER — ONDANSETRON 2 MG/ML
4 INJECTION INTRAMUSCULAR; INTRAVENOUS EVERY 30 MIN PRN
Status: DISCONTINUED | OUTPATIENT
Start: 2018-10-08 | End: 2018-10-08 | Stop reason: HOSPADM

## 2018-10-08 RX ORDER — MAGNESIUM CARB/ALUMINUM HYDROX 105-160MG
148 TABLET,CHEWABLE ORAL ONCE
Status: DISCONTINUED | OUTPATIENT
Start: 2018-10-08 | End: 2018-10-08

## 2018-10-08 RX ORDER — CALCIUM CARBONATE 500 MG/1
1000 TABLET, CHEWABLE ORAL 4 TIMES DAILY PRN
Status: DISCONTINUED | OUTPATIENT
Start: 2018-10-08 | End: 2018-10-12 | Stop reason: HOSPADM

## 2018-10-08 RX ORDER — HYDROMORPHONE HYDROCHLORIDE 1 MG/ML
.3-.5 INJECTION, SOLUTION INTRAMUSCULAR; INTRAVENOUS; SUBCUTANEOUS EVERY 5 MIN PRN
Status: DISCONTINUED | OUTPATIENT
Start: 2018-10-08 | End: 2018-10-08 | Stop reason: HOSPADM

## 2018-10-08 RX ORDER — LORATADINE 10 MG/1
10 TABLET ORAL DAILY
Status: DISCONTINUED | OUTPATIENT
Start: 2018-10-08 | End: 2018-10-12 | Stop reason: HOSPADM

## 2018-10-08 RX ORDER — PROPOFOL 10 MG/ML
INJECTION, EMULSION INTRAVENOUS PRN
Status: DISCONTINUED | OUTPATIENT
Start: 2018-10-08 | End: 2018-10-08

## 2018-10-08 RX ORDER — AMOXICILLIN 250 MG
1 CAPSULE ORAL 2 TIMES DAILY
Status: DISCONTINUED | OUTPATIENT
Start: 2018-10-08 | End: 2018-10-12 | Stop reason: HOSPADM

## 2018-10-08 RX ORDER — ONDANSETRON 4 MG/1
4 TABLET, ORALLY DISINTEGRATING ORAL EVERY 6 HOURS PRN
Status: DISCONTINUED | OUTPATIENT
Start: 2018-10-08 | End: 2018-10-12 | Stop reason: HOSPADM

## 2018-10-08 RX ORDER — DIPHENHYDRAMINE HCL 25 MG
25 CAPSULE ORAL EVERY 6 HOURS PRN
Status: DISCONTINUED | OUTPATIENT
Start: 2018-10-08 | End: 2018-10-12 | Stop reason: HOSPADM

## 2018-10-08 RX ORDER — BISACODYL 5 MG
5 TABLET, DELAYED RELEASE (ENTERIC COATED) ORAL DAILY PRN
Status: DISCONTINUED | OUTPATIENT
Start: 2018-10-08 | End: 2018-10-12 | Stop reason: HOSPADM

## 2018-10-08 RX ORDER — OXYCODONE HYDROCHLORIDE 5 MG/1
5-10 TABLET ORAL
Status: DISCONTINUED | OUTPATIENT
Start: 2018-10-08 | End: 2018-10-12 | Stop reason: HOSPADM

## 2018-10-08 RX ORDER — FENTANYL CITRATE 50 UG/ML
25-50 INJECTION, SOLUTION INTRAMUSCULAR; INTRAVENOUS
Status: DISCONTINUED | OUTPATIENT
Start: 2018-10-08 | End: 2018-10-08 | Stop reason: HOSPADM

## 2018-10-08 RX ORDER — OXYCODONE AND ACETAMINOPHEN 5; 325 MG/1; MG/1
2 TABLET ORAL EVERY 4 HOURS PRN
Status: ON HOLD | COMMUNITY
End: 2018-10-08

## 2018-10-08 RX ORDER — TRIAMCINOLONE ACETONIDE 1 MG/G
CREAM TOPICAL 2 TIMES DAILY PRN
Status: DISCONTINUED | OUTPATIENT
Start: 2018-10-08 | End: 2018-10-12 | Stop reason: HOSPADM

## 2018-10-08 RX ORDER — LIDOCAINE HYDROCHLORIDE 20 MG/ML
INJECTION, SOLUTION INFILTRATION; PERINEURAL PRN
Status: DISCONTINUED | OUTPATIENT
Start: 2018-10-08 | End: 2018-10-08

## 2018-10-08 RX ORDER — CLINDAMYCIN PHOSPHATE 900 MG/50ML
INJECTION, SOLUTION INTRAVENOUS PRN
Status: DISCONTINUED | OUTPATIENT
Start: 2018-10-08 | End: 2018-10-08

## 2018-10-08 RX ORDER — CEFAZOLIN SODIUM 2 G/100ML
2 INJECTION, SOLUTION INTRAVENOUS
Status: DISCONTINUED | OUTPATIENT
Start: 2018-10-08 | End: 2018-10-08 | Stop reason: HOSPADM

## 2018-10-08 RX ORDER — ONDANSETRON 2 MG/ML
4 INJECTION INTRAMUSCULAR; INTRAVENOUS EVERY 6 HOURS PRN
Status: DISCONTINUED | OUTPATIENT
Start: 2018-10-08 | End: 2018-10-12 | Stop reason: HOSPADM

## 2018-10-08 RX ORDER — DIPHENHYDRAMINE HYDROCHLORIDE 50 MG/ML
25 INJECTION INTRAMUSCULAR; INTRAVENOUS EVERY 6 HOURS PRN
Status: DISCONTINUED | OUTPATIENT
Start: 2018-10-08 | End: 2018-10-12 | Stop reason: HOSPADM

## 2018-10-08 RX ORDER — MAGNESIUM CARB/ALUMINUM HYDROX 105-160MG
148 TABLET,CHEWABLE ORAL
Status: DISCONTINUED | OUTPATIENT
Start: 2018-10-08 | End: 2018-10-12 | Stop reason: HOSPADM

## 2018-10-08 RX ORDER — CLINDAMYCIN PHOSPHATE 900 MG/50ML
900 INJECTION, SOLUTION INTRAVENOUS EVERY 8 HOURS
Status: COMPLETED | OUTPATIENT
Start: 2018-10-08 | End: 2018-10-09

## 2018-10-08 RX ORDER — CEFAZOLIN SODIUM 1 G/3ML
1 INJECTION, POWDER, FOR SOLUTION INTRAMUSCULAR; INTRAVENOUS SEE ADMIN INSTRUCTIONS
Status: DISCONTINUED | OUTPATIENT
Start: 2018-10-08 | End: 2018-10-08 | Stop reason: HOSPADM

## 2018-10-08 RX ORDER — NALOXONE HYDROCHLORIDE 0.4 MG/ML
.1-.4 INJECTION, SOLUTION INTRAMUSCULAR; INTRAVENOUS; SUBCUTANEOUS
Status: DISCONTINUED | OUTPATIENT
Start: 2018-10-08 | End: 2018-10-12 | Stop reason: HOSPADM

## 2018-10-08 RX ADMIN — ROCURONIUM BROMIDE 10 MG: 10 INJECTION INTRAVENOUS at 09:36

## 2018-10-08 RX ADMIN — PHENYLEPHRINE HYDROCHLORIDE 100 MCG: 10 INJECTION, SOLUTION INTRAMUSCULAR; INTRAVENOUS; SUBCUTANEOUS at 08:31

## 2018-10-08 RX ADMIN — LIDOCAINE HYDROCHLORIDE 1 ML: 10 INJECTION, SOLUTION EPIDURAL; INFILTRATION; INTRACAUDAL; PERINEURAL at 06:42

## 2018-10-08 RX ADMIN — PHENYLEPHRINE HYDROCHLORIDE 50 MCG: 10 INJECTION, SOLUTION INTRAMUSCULAR; INTRAVENOUS; SUBCUTANEOUS at 10:29

## 2018-10-08 RX ADMIN — HYDROMORPHONE HYDROCHLORIDE 0.25 MG: 1 INJECTION, SOLUTION INTRAMUSCULAR; INTRAVENOUS; SUBCUTANEOUS at 08:19

## 2018-10-08 RX ADMIN — ROCURONIUM BROMIDE 50 MG: 10 INJECTION INTRAVENOUS at 07:44

## 2018-10-08 RX ADMIN — FENTANYL CITRATE 50 MCG: 50 INJECTION, SOLUTION INTRAMUSCULAR; INTRAVENOUS at 12:36

## 2018-10-08 RX ADMIN — SODIUM CHLORIDE: 9 INJECTION, SOLUTION INTRAVENOUS at 14:17

## 2018-10-08 RX ADMIN — SODIUM CHLORIDE, POTASSIUM CHLORIDE, SODIUM LACTATE AND CALCIUM CHLORIDE: 600; 310; 30; 20 INJECTION, SOLUTION INTRAVENOUS at 06:42

## 2018-10-08 RX ADMIN — SUGAMMADEX 130 MG: 100 INJECTION, SOLUTION INTRAVENOUS at 12:14

## 2018-10-08 RX ADMIN — CLINDAMYCIN PHOSPHATE 900 MG: 18 INJECTION, SOLUTION INTRAVENOUS at 08:00

## 2018-10-08 RX ADMIN — PHENYLEPHRINE HYDROCHLORIDE 50 MCG: 10 INJECTION, SOLUTION INTRAMUSCULAR; INTRAVENOUS; SUBCUTANEOUS at 10:07

## 2018-10-08 RX ADMIN — Medication 0.5 MG: at 13:18

## 2018-10-08 RX ADMIN — FENTANYL CITRATE 100 MCG: 50 INJECTION, SOLUTION INTRAMUSCULAR; INTRAVENOUS at 07:44

## 2018-10-08 RX ADMIN — PHENYLEPHRINE HYDROCHLORIDE 50 MCG: 10 INJECTION, SOLUTION INTRAMUSCULAR; INTRAVENOUS; SUBCUTANEOUS at 11:25

## 2018-10-08 RX ADMIN — FENTANYL CITRATE 50 MCG: 50 INJECTION, SOLUTION INTRAMUSCULAR; INTRAVENOUS at 12:44

## 2018-10-08 RX ADMIN — PROPOFOL 110 MG: 10 INJECTION, EMULSION INTRAVENOUS at 07:44

## 2018-10-08 RX ADMIN — Medication: at 13:19

## 2018-10-08 RX ADMIN — SODIUM CHLORIDE, POTASSIUM CHLORIDE, SODIUM LACTATE AND CALCIUM CHLORIDE: 600; 310; 30; 20 INJECTION, SOLUTION INTRAVENOUS at 09:00

## 2018-10-08 RX ADMIN — PHENYLEPHRINE HYDROCHLORIDE 50 MCG: 10 INJECTION, SOLUTION INTRAMUSCULAR; INTRAVENOUS; SUBCUTANEOUS at 11:00

## 2018-10-08 RX ADMIN — PROPOFOL 40 MG: 10 INJECTION, EMULSION INTRAVENOUS at 12:25

## 2018-10-08 RX ADMIN — HYDROMORPHONE HYDROCHLORIDE 0.5 MG: 1 INJECTION, SOLUTION INTRAMUSCULAR; INTRAVENOUS; SUBCUTANEOUS at 08:22

## 2018-10-08 RX ADMIN — ROCURONIUM BROMIDE 10 MG: 10 INJECTION INTRAVENOUS at 10:26

## 2018-10-08 RX ADMIN — SODIUM CHLORIDE, POTASSIUM CHLORIDE, SODIUM LACTATE AND CALCIUM CHLORIDE: 600; 310; 30; 20 INJECTION, SOLUTION INTRAVENOUS at 11:52

## 2018-10-08 RX ADMIN — PHENYLEPHRINE HYDROCHLORIDE 50 MCG: 10 INJECTION, SOLUTION INTRAMUSCULAR; INTRAVENOUS; SUBCUTANEOUS at 11:15

## 2018-10-08 RX ADMIN — ROCURONIUM BROMIDE 10 MG: 10 INJECTION INTRAVENOUS at 11:02

## 2018-10-08 RX ADMIN — CLINDAMYCIN IN 5 PERCENT DEXTROSE 900 MG: 18 INJECTION, SOLUTION INTRAVENOUS at 16:01

## 2018-10-08 RX ADMIN — Medication 0.5 MG: at 13:01

## 2018-10-08 RX ADMIN — MIDAZOLAM 2 MG: 1 INJECTION INTRAMUSCULAR; INTRAVENOUS at 07:25

## 2018-10-08 RX ADMIN — PHENYLEPHRINE HYDROCHLORIDE 100 MCG: 10 INJECTION, SOLUTION INTRAMUSCULAR; INTRAVENOUS; SUBCUTANEOUS at 07:44

## 2018-10-08 RX ADMIN — ROCURONIUM BROMIDE 10 MG: 10 INJECTION INTRAVENOUS at 08:38

## 2018-10-08 RX ADMIN — PHENYLEPHRINE HYDROCHLORIDE 100 MCG: 10 INJECTION, SOLUTION INTRAMUSCULAR; INTRAVENOUS; SUBCUTANEOUS at 08:06

## 2018-10-08 RX ADMIN — PHENYLEPHRINE HYDROCHLORIDE 50 MCG: 10 INJECTION, SOLUTION INTRAMUSCULAR; INTRAVENOUS; SUBCUTANEOUS at 10:52

## 2018-10-08 RX ADMIN — LIDOCAINE HYDROCHLORIDE 40 MG: 20 INJECTION, SOLUTION INFILTRATION; PERINEURAL at 07:44

## 2018-10-08 RX ADMIN — ONDANSETRON 4 MG: 2 INJECTION INTRAMUSCULAR; INTRAVENOUS at 18:42

## 2018-10-08 RX ADMIN — ROCURONIUM BROMIDE 10 MG: 10 INJECTION INTRAVENOUS at 12:00

## 2018-10-08 RX ADMIN — PHENYLEPHRINE HYDROCHLORIDE 100 MCG: 10 INJECTION, SOLUTION INTRAMUSCULAR; INTRAVENOUS; SUBCUTANEOUS at 08:00

## 2018-10-08 RX ADMIN — FENTANYL CITRATE 50 MCG: 50 INJECTION, SOLUTION INTRAMUSCULAR; INTRAVENOUS at 12:24

## 2018-10-08 RX ADMIN — HYDROMORPHONE HYDROCHLORIDE 0.25 MG: 1 INJECTION, SOLUTION INTRAMUSCULAR; INTRAVENOUS; SUBCUTANEOUS at 08:17

## 2018-10-08 RX ADMIN — PHENYLEPHRINE HYDROCHLORIDE 50 MCG: 10 INJECTION, SOLUTION INTRAMUSCULAR; INTRAVENOUS; SUBCUTANEOUS at 11:03

## 2018-10-08 RX ADMIN — ONDANSETRON 4 MG: 2 INJECTION INTRAMUSCULAR; INTRAVENOUS at 12:14

## 2018-10-08 RX ADMIN — ROCURONIUM BROMIDE 10 MG: 10 INJECTION INTRAVENOUS at 09:00

## 2018-10-08 ASSESSMENT — ACTIVITIES OF DAILY LIVING (ADL)
ADLS_ACUITY_SCORE: 10
ADLS_ACUITY_SCORE: 9

## 2018-10-08 ASSESSMENT — LIFESTYLE VARIABLES: TOBACCO_USE: 1

## 2018-10-08 NOTE — BRIEF OP NOTE
[facility]  Spinal Surgery Brief Operative Note    Pre-operative diagnosis: DEGENERATIVE SPONDYLOLISTHESIS L5-S1 WITH RECURRENT SYNOVIAL CYST, RIGHT S1 NERVE ROOT COMPRESSION, RADIOGRAPHIC EVIDENCE OF PROGRESSIVE INSTABILITY AND NEW WEAKNESS   Post-operative diagnosis: Same   Procedure: POST SPINE FUSION L5-S1  REVISION DECOMPRESSION L5-S1 AND SYNOVIAL CYST EXCISION  POST INSTRUMENTATION L5-S1 WITH MEDTRONIC TSRH  RIGHT ILIAC CREST BONE GRAFT   Surgeon: JOE OWENS MD   Assistant(s): YSABEL PRETTY PA-C   Anesthesia: General endotracheal anesthesia   Estimated blood loss: 100 ml   Total IV fluids: (See anesthesia record)   Blood transfusion: NONE   Drains: Hemovac X 2   Specimens: NONE   Implants: AS ABOVE   Findings: AS ABOVE   Complications: NONE   Condition: STABLE   Comments: SEE DICTATED OPERATIVE REPORT FOR DETAILS

## 2018-10-08 NOTE — PLAN OF CARE
Problem: Patient Care Overview  Goal: Plan of Care/Patient Progress Review  Outcome: Improving  Pt arrived to [address] [facility] from PACU at 1415. Oriented but lethargic, arouses to voice. CMS intact, 4/5 strength in all extremities. Dressing CDI. Drain 1 had 10cc output, Drain 2 had 110cc output. Noe patent. Capno in place, pt VSS on 2 L O2. PCA for pain, pt has not been utilizing. Minimal complaints of pain as pt mostly sleeping but grunts with re-positioning. Plan to continue routine post op cares.

## 2018-10-08 NOTE — IP AVS SNAPSHOT
MRN:[id number]                      After Visit Summary   10/8/2018    Olu Osuna    MRN: [id number]           Thank you!     Thank you for choosing [facility] for your care. Our goal is always to provide you with excellent care. Hearing back from our patients is one way we can continue to improve our services. Please take a few minutes to complete the written survey that you may receive in the mail after you visit with us. Thank you!        Patient Information     Date Of Birth          1955        Designated Caregiver       Most Recent Value    Caregiver    Will someone help with your care after discharge? yes    Name of designated caregiver Rani daughter     Phone number of caregiver [phone]    Caregiver address [address] [employer] [facility] [employer] [facility] [address], [place], [place]      About your hospital stay     You were admitted on:  October 8, 2018 You last received care in the:  [facility] [address] [facility]    You were discharged on:  October 12, 2018       Who to Call     For medical emergencies, please call 911.  For non-urgent questions about your medical care, please call your primary care provider or clinic, [phone]  For questions related to your surgery, please call your surgery clinic        Attending Provider     Provider Hernesto Salgado MD Orthopedics       Primary Care Provider Office Phone # Fax #    Yusuf Orona Blauvelt [phone]      Follow-up Appointments     Follow-up and recommended labs and tests        Give discharge instruction sheet and dressing supplies.  Patient has a follow-up appointment to see me in three weeks.  Please call Dr Leyva's clinic at [phone] if you have questions.                  Further instructions from your care team                Care after a Spinal Fusion - Dr. Hernesto Leyva      The following information will help you through your recovery at home.    Pain    It is normal for you to experience pain after your  surgery, most patients feel their pre-op pain has improved and they are left with a fair amount of surgical pain. This pain will improve a great deal over the 5-7 days you are in the hospital.      It is normal to have some ongoing pain when you get home from the hospital.  The pain should slowly improve over the next several weeks to months.     You should call Dr. Leyva s office if you have a sudden onset of pain that does not improve over 24 hours.     Activity    You may increase your activity as tolerated, walking is the best form of exercise after spine surgery.      Avoid bending, lifting and twisting at the waist (BLT).  Avoid activities such as vacuuming, raking and shoveling.     You should wear your lumbar support when you are up out of bed. You should wear the brace for 6 weeks after your surgery.    Try to limit your lifting to 10lbs until you see Dr. Leyva at your post-op appointment.    You should anticipate being out of work approximately 4-6 weeks after your surgery, some patients are able to return sooner and others need more time.  Dr. Leyva and his staff will help you determine a return to work plan.      You may resume sexual activity 4-6 weeks after surgery.  Stop if you have pain.    Driving    You may drive if you feel strong enough and are not taking any narcotic pain medications.    Incision Site    When you leave the hospital keep your back incision covered for 10 days from your surgery date with daily dressing changes. If you have a stomach incision it can be left uncovered.    You may shower without covering your incision once you are home from the hospital, allow water and soap to run over your incision but do not scrub the area or soak in a tub.      Your incision is covered with steri-strips (narrow white tapes); they will get loose and fall off in 7-10 days.  If they do not fall off after 10 days you may remove them or Dr. Leyva s staff will remove for you at your post-op  visit.    Most patients have dissolvable stitches which do not need to be removed.  In some cases staples or nylon stitches are used and they need to be removed, 10-14 days after surgery. If you have staples or nylon sutures please call for a removal appointment with Dr. Gordon [place] nurse.    Pain Management     Take your prescribed pain medication as needed and directed.  Use Tylenol for your discomfort when you no longer need the narcotic pain medication.      DO NOT take Ibuprofen, Aleve, Motrin, Advil or any other anti-inflammatory medication.  They may affect the bone growth of your fusion.    If you need a refill on your pain medication call [phone], please allow 24 hour for your prescription to be refilled, Dr. Gordon [place] office does not refill pain medications on Friday.    Follow-up Visits     You should have a post-op appointment that was scheduled for you at the same time your surgery was scheduled. If you do not please call Dr. Gordon [place] office as soon as you get home.  Your appointment will be approximately one month after your surgery.    Write down any questions you have about your surgery, recovery, return to work and other topics you wished to be covered at your post-op visit.  This way, we will be able to address all of your questions at your next visit.    Call your doctor if you have any questions or concerns.    When to Call your Doctor    If you have any redness, warmth or swelling at the incision site.    If your incision opens up.    If you have increasing drainage from your incision.    If you have a temperature of more than 100.5 degrees Fahrenheit.    [address] [place] [address]  Phone: [phone]  Fax: [phone]  Web site: [email or web address]      Pending Results     No orders found from 10/6/2018 to 10/9/2018.            Statement of Approval     Ordered          10/12/18 0804  I have reviewed and agree with all the recommendations and orders detailed in this document.   "EFFECTIVE NOW     Approved and electronically signed by:  Hernesto Leyva MD             Admission Information     Date & Time Provider Department Dept. Phone    10/8/2018 Hernesto Leyva MD [address] [facility] [phone]      Your Vitals Were     Blood Pressure Pulse Temperature Respirations Height Weight    119/78 (BP Location: Right arm) 84 99.1  F (37.3  C) (Oral) 16 1.778 m (5' 10\") 68 kg (149 lb 14.4 oz)    Pulse Oximetry BMI (Body Mass Index)                95% 21.51 kg/m2          MyChart Information     Sn[employer]shot lets you send messages to your doctor, view your test results, renew your prescriptions, schedule appointments and more. To sign up, go to [email or web address] . Click on \"Log in\" on the left side of the screen, which will take you to the Welcome page. Then click on \"Sign up Now\" on the right side of the page.     You will be asked to enter the access code listed below, as well as some personal information. Please follow the directions to create your username and password.     Your access code is: [email or web address]  Expires: 1/10/2019  1:20 PM     Your access code will  in 90 days. If you need help or a new code, please call your [facility] clinic or [phone].        Care EveryWhere ID     This is your Care EveryWhere ID. This could be used by other organizations to access your [facility] medical records  [id number]        Equal Access to Services     ESTRADA [facility] CenterTHOMAS AH: Hadii tara ku hadasho Soomaali, waaxda luqadaha, qaybta kaalmada elaineyada, ryan ramírez . So [facility] [phone].    ATENCIÓN: Si habla español, tiene a pitts disposición servicios gratuitos de asistencia lingüística. Llame al [phone].    We comply with applicable federal civil rights laws and [place] laws. We do not discriminate on the basis of race, color, national origin, age, disability, sex, sexual orientation, or gender identity.               Review of your medicines "      START taking        Dose / Directions    order for DME        Equipment being ordered: Walker Wheels () and Walker () Treatment Diagnosis: difficulty ambulating   Quantity:  1 each   Refills:  0         CONTINUE these medicines which may have CHANGED, or have new prescriptions. If we are uncertain of the size of tablets/capsules you have at home, strength may be listed as something that might have changed.        Dose / Directions    * OXYCODONE HCL PO   This may have changed:  Another medication with the same name was added. Make sure you understand how and when to take each.        Dose:  5 mg   Take 5 mg by mouth every 12 hours as needed (Pain)   Refills:  0       * oxyCODONE IR 5 MG tablet   Commonly known as:  ROXICODONE   This may have changed:  You were already taking a medication with the same name, and this prescription was added. Make sure you understand how and when to take each.   Used for:  Status post arthrodesis        Dose:  5-10 mg   Take 1-2 tablets (5-10 mg) by mouth every 3 hours as needed   Quantity:  50 tablet   Refills:  0       * Notice:  This list has 2 medication(s) that are the same as other medications prescribed for you. Read the directions carefully, and ask your doctor or other care provider to review them with you.      CONTINUE these medicines which have NOT CHANGED        Dose / Directions    CENTRUM SILVER per tablet        Dose:  1 tablet   Take 1 tablet by mouth daily   Refills:  0       fluticasone 50 MCG/ACT spray   Commonly known as:  FLONASE        Dose:  2 spray   Spray 2 sprays into both nostrils daily as needed   Refills:  0       loratadine 10 MG tablet   Commonly known as:  CLARITIN        Dose:  10 mg   Take 10 mg by mouth daily   Refills:  0       nicotine polacrilex 4 MG gum   Commonly known as:  NICORETTE        Dose:  4 mg   Place 4 mg inside cheek daily as needed for smoking cessation   Refills:  0       triamcinolone 0.1 % cream   Commonly known as:   KENALOG        Apply topically 2 times daily as needed for irritation   Refills:  0         STOP taking     CELEBREX PO                Where to get your medicines      Some of these will need a paper prescription and others can be bought over the counter. Ask your nurse if you have questions.     Bring a paper prescription for each of these medications     order for DME    oxyCODONE IR 5 MG tablet                Protect others around you: Learn how to safely use, store and throw away your medicines at www.disposemymeds.org.        Information about OPIOIDS     PRESCRIPTION OPIOIDS: WHAT YOU NEED TO KNOW   We gave you an opioid (narcotic) pain medicine. It is important to manage your pain, but opioids are not always the best choice. You should first try all the other options your care team gave you. Take this medicine for as short a time (and as few doses) as possible.    Some activities can increase your pain, such as bandage changes or therapy sessions. It may help to take your pain medicine 30 to 60 minutes before these activities. Reduce your stress by getting enough sleep, working on hobbies you enjoy and practicing relaxation or meditation. Talk to your care team about ways to manage your pain beyond prescription opioids.    These medicines have risks:    DO NOT drive when on new or higher doses of pain medicine. These medicines can affect your alertness and reaction times, and you could be arrested for driving under the influence (DUI). If you need to use opioids long-term, talk to your care team about driving.    DO NOT operate heavy machinery    DO NOT do any other dangerous activities while taking these medicines.    DO NOT drink any alcohol while taking these medicines.     If the opioid prescribed includes acetaminophen, DO NOT take with any other medicines that contain acetaminophen. Read all labels carefully. Look for the word  acetaminophen  or  Tylenol.  Ask your pharmacist if you have questions or  are unsure.    You can get addicted to pain medicines, especially if you have a history of addiction (chemical, alcohol or substance dependence). Talk to your care team about ways to reduce this risk.    All opioids tend to cause constipation. Drink plenty of water and eat foods that have a lot of fiber, such as fruits, vegetables, prune juice, apple juice and high-fiber cereal. Take a laxative (Miralax, milk of magnesia, Colace, Senna) if you don t move your bowels at least every other day. Other side effects include upset stomach, sleepiness, dizziness, throwing up, tolerance (needing more of the medicine to have the same effect), physical dependence and slowed breathing.    Store your pills in a secure place, locked if possible. We will not replace any lost or stolen medicine. If you don t finish your medicine, please throw away (dispose) as directed by your pharmacist. The [place] Pollution Control Agency has more information about safe disposal: https://www.pca.[facility].[place].us/living-green/managing-unwanted-medications             Medication List: This is a list of all your medications and when to take them. Check marks below indicate your daily home schedule. Keep this list as a reference.      Medications           Morning Afternoon Evening Bedtime As Needed    CENTRUM SILVER per tablet   Take 1 tablet by mouth daily                                   fluticasone 50 MCG/ACT spray   Commonly known as:  FLONASE   Spray 2 sprays into both nostrils daily as needed                                   loratadine 10 MG tablet   Commonly known as:  CLARITIN   Take 10 mg by mouth daily   Last time this was given:  10 mg on 10/12/2018  8:39 AM                                   nicotine polacrilex 4 MG gum   Commonly known as:  NICORETTE   Place 4 mg inside cheek daily as needed for smoking cessation                                   order for DME   Equipment being ordered: Walker Wheels () and Walker () Treatment  Diagnosis: difficulty ambulating                                * OXYCODONE HCL PO   Take 5 mg by mouth every 12 hours as needed (Pain)   Last time this was given:  10 mg on 10/12/2018  5:40 PM                                   * oxyCODONE IR 5 MG tablet   Commonly known as:  ROXICODONE   Take 1-2 tablets (5-10 mg) by mouth every 3 hours as needed   Last time this was given:  10 mg on 10/12/2018  5:40 PM                                   triamcinolone 0.1 % cream   Commonly known as:  KENALOG   Apply topically 2 times daily as needed for irritation                                   * Notice:  This list has 2 medication(s) that are the same as other medications prescribed for you. Read the directions carefully, and ask your doctor or other care provider to review them with you.

## 2018-10-08 NOTE — LETTER
Transition Communication Hand-off for Care Transitions to Next Level of Care Provider  Name: Olu Osuna  : 1955  MRN #: [id number]  Primary Care Provider: Yusuf Dey   Primary Clinic: [facility] [address] [facility] [address]  [place] [address]   Reason for Hospitalization:  DEGENERATIVE SPONDYLOLISTHESIS L5-S1 WITH RECURRENT SYNOVIAL CYST, RIGHT S1 NERVE ROOT COMPRESSION, RADIOGRAPHIC EVIDENCE OF PROGRESSIVE INSTABILITY AND NEW WEAKNESS  Synovial cyst  Admit Date/Time: 10/8/2018  5:44 AM  Discharge Date: 10/12   Payor Source: Payor: MEDICA / Plan: MEDICA ACCESS ABILITY MA / Product Type: HMO /   Readmission Assessment Measure (KASSANDRA) Risk Score/category: low   Reason for Communication Hand-off Referral: Non-adherence to plan of care related to:  Declined TCU, declined home care  Discharge Plan: home wt family assist   Concern for non-adherence with plan of care:   Y/N maybe  Discharge Needs Assessment:  Needs       Most Recent Value    Equipment Currently Used at Home cane, straight, walker, rolling    Transportation Available car        Follow-up specialty is recommended: Yes    Follow-up plan:  Future Appointments  Date Time Provider Department Center   10/12/2018 6:30 PM Paula Craft [facility]        Pt is status post revision decompression, excision of synovial cyst and posterior spinal fusion L5-S1 and is discharging today.  TCU has been suggested at discharge since [facility] services will not be available to pt for 2-3 weeks, but pt refused TCU recommendation.  He is adamant to discharge home.  Pt also refused home care. He states his daughter and friend will be assisting him.  Pt may benefit from outpatient care coordination to ensure patient is safe and stable at home.      Jami Presley, RN, BSN  Care Coordinator, [facility]  [phone]      AVS/Discharge Summary is the source of truth; this is a helpful guide for improved communication of  patient story

## 2018-10-08 NOTE — OP NOTE
Procedure Date: 10/08/2018      PREOPERATIVE DIAGNOSES:   1.  Degenerative spondylolisthesis of L5 on S1 with recurrent synovial cyst and right S1 nerve root compression, status post previous L5-S1 synovial cyst excision.   2.  Progressive instability with a new S1 weakness.      POSTOPERATIVE DIAGNOSES:     1.  Degenerative spondylolisthesis of L5 on S1 with recurrent synovial cyst and right S1 nerve root compression, status post previous L5-S1 synovial cyst excision.   2.  Progressive instability with a new S1 weakness.      PROCEDURES:   1.  Posterior spinal fusion, L5-S1, with morcellized autologous bone graft.   2.  Bilateral L5-S1 revision decompression and excision of synovial cysts bilaterally   3.  Posterior nonsegmental instrumentation with Staafftronic TSRH 3D instrumentation.   4.  [place] of right iliac crest bone graft.      SURGEON:  Hernesto Leyva MD      ASSISTANT:  Jelena Cotto PA-C      ANESTHESIA:  General.      DESCRIPTION OF PROCEDURE:  The patient was brought to the operating room.  A general anesthetic was administered by anesthesiology staff.  A Noe catheter was placed.  The patient was positioned prone on and Trios table, carefully padded and positioned and his back was prepped and draped in a routine sterile fashion.      After the timeout, a midline skin incision was made over the lower lumbar spine.  His previous midline skin incision was reutilized and extended slightly proximally and distally.  The fascia was incised and a clamp was placed on the spinous process of what was felt to be L5.  A lateral x-ray was obtained.  This confirmed that it was on the spinous process of L5.  This was marked with a rongeur.  Bilateral exposure out to the tips of the transverse processes of L5 and the sacral ala was then performed.  Care was taken to protect the facet joints at L3-4 and L4-5.  Self-retaining Issa retractor was placed.      Soft tissue was removed from the posterior elements of L5  and S1.  Scar tissue had to be removed from both sides.  Facet capsulectomies were done at L5-S1 bilaterally.  He had severe facet arthropathy on both sides, worse on the right than the left.  After the facet capsulectomy and cleaning off the posterior lamina of both L5 and S1 and cleaning off the soft tissue from the facet joints and sacral ala, we were ready for the revision decompression.  Using a , I opened up the facet joints.  I trimmed the facet joints from inside where the synovial cyst was.  The synovial cyst was quite large on the right side, putting a lot of pressure on the traversing S1 nerve root.  A medial facetectomy was performed on that side.  Revision laminotomy along the superior edge of the lamina of S1 was performed.  The S1 nerve root was identified.  The pedicle of S1 was identified and then I traced the decompression, all the way up along the medial edge of the superior articular facet of S1.  This completely decompressed the S1 nerve root and the spinal canal.  I then turned my attention to the left side.  On the left side it was slightly easier.  The facet joints were not quite as arthritic.  There was residual ligamentum flavum there which I removed.  The S1 nerve root was identified and a laminotomy was performed of both L5 and S1, enough to give me good visualization and make sure there was no compression of the spinal canal.      When I was done with the bilateral revision decompressions, we were ready for instrumentation.      The pedicles of L5 and S1 were opened bilaterally, first with a bur, then a blunt probe.  Markers were placed in the pedicles and AP and lateral C-arm views were obtained, showing good position of the markers with good depth and angulation.  The pedicles were then tapped, reprobed to make sure there was no violation of the pedicles, and then 6.5 mm x 45 mm screws were placed at L5 and 6.5 x 40 mm screws were placed at S1 bilaterally.  I had good purchase  with the screws.  C-arm views were obtained showing good depth and angulation again with the screws      At this point, a subcutaneous dissection over the right iliac crest was performed.  Fascia over the crest was incised and the outer table exposed.  Bone graft was harvested using osteotomes and gouges.  It was notable that he had quite a bit of fatty infiltration of his bone marrow.  When an adequate amount had been harvested, the bleeding cancellous surface was covered with bone wax, irrigated with antibiotic solution, and then the fascia was closed with a Stratafix suture and interrupted #1 Vicryl sutures.      Attention was turned back to the midline.  Gelpi retractors were placed.  One liter of antibiotic solution was used to irrigate out the wound.  A bur was then used to clean off the posterior elements of L5 and S1.  The TPs of L5 and the sacral ala had been decorticated prior to putting in the screws.      Another liter of antibiotic solution was used.  Fish scaling with a Capener gouge was performed.  Bone graft was then placed in the lateral gutters bilaterally.  This was followed by placing the connectors and connecting rods.  These were tightened to specification.  The best bone graft was then placed in over the posterior elements over the fish scaled lamina and in the facet joints, and the rest of it was used in the lateral gutters.  Gelfoam was placed over the exposed dura.      The wound was then closed in routine fashion with a channel drain deep to the fascia.  The fascia was repaired with running and interrupted #1 Vicryl suture.  2-0 was used for the subQ with another channel drain in the subQ, and 3-0 Vicryl for the skin.  Dressings were applied, drapes removed.  He was transferred back to the hospital cart and taken to the recovery room in good condition.  Estimated blood loss was 100 mL.      COMPLICATIONS:  None.      DRAINS:  Two channel drains.         JOE OWENS MD             D:  10/08/2018   T: 10/08/2018   MT: RENUKA      Name:     MARIELOS CLARK   MRN:      [id number]        Account:        [id number]   :      1955           Procedure Date: 10/08/2018      Document: [id number]       cc: Yusuf Dey MD

## 2018-10-08 NOTE — ANESTHESIA CARE TRANSFER NOTE
Patient: Olu Osuna    Procedure(s):  BILATERAL L5-S1 REVISION DECOMPRESSION, POSTERIOR SPINAL FUSION L5-S1 WITH MEDTRONIC INSTRUMENTATION, RIGHT ILIAC CREST BONE GRAFT  - Wound Class: I-Clean      Diagnosis: DEGENERATIVE SPONDYLOLISTHESIS L5-S1 WITH RECURRENT SYNOVIAL CYST, RIGHT S1 NERVE ROOT COMPRESSION, RADIOGRAPHIC EVIDENCE OF PROGRESSIVE INSTABILITY AND NEW WEAKNESS  Diagnosis Additional Information: No value filed.    Anesthesia Type:   General, ETT     Note:  Airway :Face Mask  Patient transferred to:PACU  Comments: Neuromuscular blockade reversed after TOF 4/4, spontaneous respirations, adequate tidal volumes, followed commands to voice, oropharynx suctioned with soft flexible catheter, extubated atraumatically, extubated with suction, airway patent after extubation.  Oxygen via facemask at 6 liters per minute to PACU. Oxygen tubing connected to wall O2 in PACU, SpO2, NiBP, and EKG monitors and alarms on and functioning, Chantell Hugger warmer connected to patient gown, report on patient's clinical status given to PACU RN, RN questions answered. Handoff Report: Identifed the Patient, Identified the Reponsible Provider, Reviewed the pertinent medical history, Discussed the surgical course, Reviewed Intra-OP anesthesia mangement and issues during anesthesia, Set expectations for post-procedure period and Allowed opportunity for questions and acknowledgement of understanding      Vitals: (Last set prior to Anesthesia Care Transfer)    CRNA VITALS  10/8/2018 1207 - 10/8/2018 1245      10/8/2018             Resp Rate (observed): (!)  1                Electronically Signed By: MARVA Acuna CRNA  October 8, 2018  12:45 PM

## 2018-10-08 NOTE — PROGRESS NOTES
"S: Pt was seen today at [facility] for eval/fitting for an LSO corset as ordered for post op.  OG:  Reduce pain and motion of the lumbar spine.  A: At this time I have fit pt with a size 32-38\" panel LSO corset with Velcro closures. The patient was instructed on donning/doffing. Care was taken in selection of the proper size LSO to insure an intimate fit.  Upon completion of the initial fitting the patient had no complaints, and the fit of the orthosis appeared to be satisfactory at this time.   P: The pt was instructed to call if any problems or questions arise.     Randy SHIELDS LO      "

## 2018-10-08 NOTE — PROGRESS NOTES
Admission medication history interview status for the 10/8/2018  admission is complete. See EPIC admission navigator for prior to admission medications     Medication history source reliability:Good    Medication history interview source(s):Patient    Medication history resources (including written lists, pill bottles, clinic record):None    Primary pharmacy.[facility], ([address] [place]    Additional medication history information not noted on PTA med list :Confirm Oxycodone with [facility] ([place])    Time spent in this activity: 30 minutes    Prior to Admission medications    Medication Sig Last Dose Taking? Auth Provider   Celecoxib (CELEBREX PO) Take 200 mg by mouth daily Over 1 week ago at prn Yes Reported, Patient   fluticasone (FLONASE) 50 MCG/ACT nasal spray Spray 2 sprays into both nostrils daily as needed  10/6/2018 at prn Yes Reported, Patient   loratadine (CLARITIN) 10 MG tablet Take 10 mg by mouth daily 10/7/2018 at am Yes Reported, Patient   Multiple Vitamins-Minerals (CENTRUM SILVER) per tablet Take 1 tablet by mouth daily 10/7/2018 at am Yes Reported, Patient   nicotine polacrilex (NICORETTE) 4 MG gum Place 4 mg inside cheek daily as needed for smoking cessation 10/8/2018 at 0000 Yes Reported, Patient   OXYCODONE HCL PO Take 5 mg by mouth every 12 hours as needed (Pain) 10/6/2018 at prn Yes Reported, Patient   triamcinolone (KENALOG) 0.1 % cream Apply topically 2 times daily as needed for irritation 10/6/2018 at prn Yes Reported, Patient

## 2018-10-08 NOTE — IP AVS SNAPSHOT
[address] [facility]    [facility][note 3] [address]    [place] [address]    Phone:  [phone]                                       After Visit Summary   10/8/2018    Olu Osuna    MRN: [id number]           After Visit Summary Signature Page     I have received my discharge instructions, and my questions have been answered. I have discussed any challenges I see with this plan with the nurse or doctor.    ..........................................................................................................................................  Patient/Patient Representative Signature      ..........................................................................................................................................  Patient Representative Print Name and Relationship to Patient    ..................................................               ................................................  Date                                   Time    ..........................................................................................................................................  Reviewed by Signature/Title    ...................................................              ..............................................  Date                                               Time          22EPIC Rev 08/18

## 2018-10-08 NOTE — ANESTHESIA POSTPROCEDURE EVALUATION
Patient: Olu Osuna    Procedure(s):  BILATERAL L5-S1 REVISION DECOMPRESSION, POSTERIOR SPINAL FUSION L5-S1 WITH MEDTRONIC INSTRUMENTATION, RIGHT ILIAC CREST BONE GRAFT  - Wound Class: I-Clean      Diagnosis:DEGENERATIVE SPONDYLOLISTHESIS L5-S1 WITH RECURRENT SYNOVIAL CYST, RIGHT S1 NERVE ROOT COMPRESSION, RADIOGRAPHIC EVIDENCE OF PROGRESSIVE INSTABILITY AND NEW WEAKNESS  Diagnosis Additional Information: No value filed.    Anesthesia Type:  General, ETT    Note:  Anesthesia Post Evaluation    Patient location during evaluation: PACU  Patient participation: Able to fully participate in evaluation  Level of consciousness: awake and alert  Pain management: adequate  Airway patency: patent  Cardiovascular status: acceptable and hemodynamically stable  Respiratory status: acceptable and nasal cannula  Hydration status: euvolemic  PONV: none             Last vitals:  Vitals:    10/08/18 1340 10/08/18 1415 10/08/18 1444   BP: 107/70 133/81 145/85   Pulse:      Resp: 14 20 17   Temp:  36.9  C (98.4  F)    SpO2: 97% 98% 97%         Electronically Signed By: Rico Coello MD  October 8, 2018  3:11 PM

## 2018-10-08 NOTE — ANESTHESIA PREPROCEDURE EVALUATION
Procedure: Procedure(s):  COMBINED DECOMPRESSION, FUSION LUMBAR POSTERIOR ONE LEVEL  GRAFT BONE FROM ILIAC CREST  Preop diagnosis: DEGENERATIVE SPONDYLOLISTHESIS L5-S1 WITH RECURRENT SYNOVIAL CYST, RIGHT S1 NERVE ROOT COMPRESSION, RADIOGRAPHIC EVIDENCE OF PROGRESSIVE INSTABILITY AND NEW WEAKNESS    Allergies   Allergen Reactions     Chantix [Varenicline]      Gadolinium Derivatives      Contrast media     Nicotine      Nicotine patch causes itching.     Penicillins      Past Medical History:   Diagnosis Date     Arthritis     Bilateral knees     Cervical radiculopathy     C4, C5, C6     Depression      Erectile dysfunction      GERD (gastroesophageal reflux disease)      Hepatitis     Chronic Hepatitis C     Tobacco use      Past Surgical History:   Procedure Laterality Date     CL AFF SURGICAL PATHOLOGY      lipoma resection     COLONOSCOPY       FUSION CERVICAL ANTERIOR TWO LEVELS WITH BONE ALLOGRAFT N/A 1/4/2016    Procedure: FUSION CERVICAL ANTERIOR TWO LEVELS WITH BONE ALLOGRAFT;  Surgeon: Delfin Flores MD;  Location: [facility] OR     GRAFT BONE FROM ILIAC CREST Left 1/4/2016    Procedure: GRAFT BONE FROM ILIAC CREST;  Surgeon: Delfin Flores MD;  Location: [facility] OR     HEMORRHOID SURGERY       ORTHOPEDIC SURGERY  2013    bilateral total knee arthroplasty     Social History   Substance Use Topics     Smoking status: Current Every Day Smoker     Packs/day: 0.50     Years: 45.00     Types: Cigarettes     Smokeless tobacco: Never Used     Alcohol use Yes      Comment: 18 cans of beer per week     Prior to Admission medications    Medication Sig Start Date End Date Taking? Authorizing Provider   Celecoxib (CELEBREX PO) Take 200 mg by mouth daily   Yes Reported, Patient   fluticasone (FLONASE) 50 MCG/ACT nasal spray Spray 2 sprays into both nostrils daily as needed    Yes Reported, Patient   loratadine (CLARITIN) 10 MG tablet Take 10 mg by mouth daily   Yes Reported, Patient   Multiple Vitamins-Minerals  (CENTRUM SILVER) per tablet Take 1 tablet by mouth daily   Yes Reported, Patient   nicotine polacrilex (NICORETTE) 4 MG gum Place 4 mg inside cheek daily as needed for smoking cessation   Yes Reported, Patient   OXYCODONE HCL PO Take 5 mg by mouth every 12 hours as needed (Pain)   Yes Reported, Patient   triamcinolone (KENALOG) 0.1 % cream Apply topically 2 times daily as needed for irritation   Yes Reported, Patient     Current Facility-Administered Medications Ordered in Epic   Medication Dose Route Frequency Last Rate Last Dose     ceFAZolin (ANCEF) 1 g vial to attach to  ml bag for ADULT or 50 ml bag for PEDS  1 g Intravenous See Admin Instructions         ceFAZolin (ANCEF) intermittent infusion 2 g in 100 mL dextrose PRE-MIX  2 g Intravenous Pre-Op/Pre-procedure x 1 dose         lactated ringers infusion   Intravenous Continuous         lidocaine 1 % 1 mL  1 mL Other Q1H PRN         No current [facility]-ordered outpatient prescriptions on file.       lactated ringers       Wt Readings from Last 1 Encounters:   10/27/17 63.5 kg (140 lb)     Temp Readings from Last 1 Encounters:   10/08/18 36.3  C (97.4  F) (Temporal)     BP Readings from Last 6 Encounters:   10/08/18 120/88   10/27/17 134/70   01/05/16 (!) 141/95     Pulse Readings from Last 4 Encounters:   10/08/18 77   10/27/17 82     Resp Readings from Last 1 Encounters:   10/08/18 16     SpO2 Readings from Last 1 Encounters:   10/08/18 99%     Recent Labs   Lab Test  10/27/17   1310  01/04/16   0825   NA  138   --    POTASSIUM  3.9  3.8   CHLORIDE  105   --    CO2  27   --    ANIONGAP  6   --    GLC  91  93   BUN  12   --    CR  0.80   --    BROCK  9.4   --      No results for input(s): AST, ALT, ALKPHOS, BILITOTAL, LIPASE in the last 59673 hours.  Recent Labs   Lab Test  10/27/17   1310   WBC  5.0   HGB  13.5   PLT  222     No results for input(s): ABO, RH in the last 24260 hours.  No results for input(s): INR, PTT in the last 85912 hours.   No results for  input(s): TROPI in the last 25660 hours.  No results for input(s): PH, PCO2, PO2, HCO3 in the last 01714 hours.  No results for input(s): HCG in the last 00852 hours.  No results found for this or any previous visit (from the past 744 hour(s)).    RECENT LABS:   ECG:   ECHO:       Anesthesia Evaluation     . Pt has had prior anesthetic.     No history of anesthetic complications          ROS/MED HX    ENT/Pulmonary:     (+)tobacco use, , . .   (-) sleep apnea and recent URI   Neurologic: Comment: Cervical radiculopathy - neg neurologic ROS   (+)other neuro hx cervical radiculopathy    Cardiovascular:         METS/Exercise Tolerance:     Hematologic:  - neg hematologic  ROS       Musculoskeletal:  - neg musculoskeletal ROS       GI/Hepatic:     (+) GERD Asymptomatic on medication, hepatitis type C,       Renal/Genitourinary:         Endo:     (+) type II DM .      Psychiatric:         Infectious Disease:         Malignancy:         Other:                     Physical Exam  Normal systems: cardiovascular and pulmonary    Airway   Mallampati: II  TM distance: >3 FB  Neck ROM: full    Dental     Cardiovascular       Pulmonary                         Anesthesia Plan      History & Physical Review  History and physical reviewed and following examination; no interval change.    ASA Status:  2 .    NPO Status:  > 8 hours    Plan for General and ETT with Intravenous and Propofol induction. Maintenance will be Balanced.    PONV prophylaxis:  Ondansetron (or other 5HT-3) and Dexamethasone or Solumedrol  Additional equipment: Videolaryngoscope and 2nd IV      Postoperative Care  Postoperative pain management:  IV analgesics.      Consents  Anesthetic plan, risks, benefits and alternatives discussed with:  Patient or representative.  Use of blood products discussed: Yes.   Use of blood products discussed with Patient.  Consented to blood products.  .                          .

## 2018-10-09 ENCOUNTER — APPOINTMENT (OUTPATIENT)
Dept: PHYSICAL THERAPY | Facility: CLINIC | Age: 63
DRG: 460 | End: 2018-10-09
Attending: ORTHOPAEDIC SURGERY
Payer: COMMERCIAL

## 2018-10-09 ENCOUNTER — APPOINTMENT (OUTPATIENT)
Dept: PHYSICAL THERAPY | Facility: CLINIC | Age: 63
DRG: 460 | End: 2018-10-09
Attending: PHYSICIAN ASSISTANT
Payer: COMMERCIAL

## 2018-10-09 LAB
GLUCOSE BLDC GLUCOMTR-MCNC: 115 MG/DL (ref 70–99)
HGB BLD-MCNC: 11.6 G/DL (ref 13.3–17.7)

## 2018-10-09 PROCEDURE — 40000193 ZZH STATISTIC PT WARD VISIT

## 2018-10-09 PROCEDURE — 97116 GAIT TRAINING THERAPY: CPT | Mod: GP

## 2018-10-09 PROCEDURE — 00000146 ZZHCL STATISTIC GLUCOSE BY METER IP

## 2018-10-09 PROCEDURE — 25000128 H RX IP 250 OP 636: Performed by: PHYSICIAN ASSISTANT

## 2018-10-09 PROCEDURE — 12000000 ZZH R&B MED SURG/OB

## 2018-10-09 PROCEDURE — 85018 HEMOGLOBIN: CPT | Performed by: PHYSICIAN ASSISTANT

## 2018-10-09 PROCEDURE — 36415 COLL VENOUS BLD VENIPUNCTURE: CPT | Performed by: PHYSICIAN ASSISTANT

## 2018-10-09 PROCEDURE — 25000125 ZZHC RX 250: Performed by: PHYSICIAN ASSISTANT

## 2018-10-09 PROCEDURE — 97161 PT EVAL LOW COMPLEX 20 MIN: CPT | Mod: GP

## 2018-10-09 PROCEDURE — 25000132 ZZH RX MED GY IP 250 OP 250 PS 637: Performed by: PHYSICIAN ASSISTANT

## 2018-10-09 PROCEDURE — 25000131 ZZH RX MED GY IP 250 OP 636 PS 637: Performed by: PHYSICIAN ASSISTANT

## 2018-10-09 PROCEDURE — 97530 THERAPEUTIC ACTIVITIES: CPT | Mod: GP

## 2018-10-09 RX ADMIN — Medication: at 14:26

## 2018-10-09 RX ADMIN — LORATADINE 10 MG: 10 TABLET ORAL at 09:37

## 2018-10-09 RX ADMIN — SENNOSIDES AND DOCUSATE SODIUM 1 TABLET: 8.6; 5 TABLET ORAL at 21:32

## 2018-10-09 RX ADMIN — CLINDAMYCIN IN 5 PERCENT DEXTROSE 900 MG: 18 INJECTION, SOLUTION INTRAVENOUS at 03:44

## 2018-10-09 RX ADMIN — SODIUM CHLORIDE: 9 INJECTION, SOLUTION INTRAVENOUS at 18:23

## 2018-10-09 RX ADMIN — ONDANSETRON 4 MG: 4 TABLET, ORALLY DISINTEGRATING ORAL at 08:23

## 2018-10-09 RX ADMIN — SENNOSIDES AND DOCUSATE SODIUM 1 TABLET: 8.6; 5 TABLET ORAL at 09:37

## 2018-10-09 ASSESSMENT — ACTIVITIES OF DAILY LIVING (ADL)
ADLS_ACUITY_SCORE: 9
ADLS_ACUITY_SCORE: 9
ADLS_ACUITY_SCORE: 10
ADLS_ACUITY_SCORE: 9

## 2018-10-09 NOTE — PROGRESS NOTES
CM    I:  MERI was asked to meet with Stuart Rodriguez,  from [facility] (), who had met with patient in an attempt to complete a PCA assessment.  Mr. Rodriguez reports patient has a CAD , [facility].  Ms Brewer was aware patient was having back surgery, anticipating patient will need an increase in care once he returns home.  Mr. Rodriguez asked SW to fax him the following information which will allow him to complete the PCA assessment: Face Sheet, Therapy notes, surgical report which includes the name of the procedure that was completed to:Fax # [phone], which was done.  MERI explained to Mr. Rodriguez that patient should have an assessment done by [facility] therapy, which will assist us with goals of care for time of discharge. Mr. Rodriguez stated he is hopeful patient can discharge to a TCU which would allow them some time to get a PCA arranged for in home assistance.      P: Continue to assist as needed.    NNEKA Draper    UPDATE@1365: MERI received VM from patient's [facility] Care Coordinator, Yue COLE () who asked for call back with update and potential discharge date.      UPDATE@2424:  [facility] Care Coordinator explains patient's PCA services will not be set up for at least 2-3 weeks, thus, she expresses concerns about patient returning home alone.  Care Coordinator reports patient does have a daughter in [place] who may be able to stay with him but [facility] staff would need to discuss this with patient.  Care Coordinator was hopeful patient could discharge to a TCU before returning home.

## 2018-10-09 NOTE — PROGRESS NOTES
[facility]    Spine Surgery  Daily Post-Op Note    Assessment & Plan   Procedure(s):  COMBINED DECOMPRESSION, FUSION LUMBAR POSTERIOR ONE LEVEL  GRAFT BONE FROM ILIAC CREST   -1 Day Post-Op      ASSESSMENT:  Stable postoperative day #1, status post revision decompression, excision of synovial cyst and posterior spinal fusion L5-S1    PLAN:  Mobilized today, removed drains  Leave Noe catheter in until he is ambulated ×2  Continue PCA until nausea has resolved  Continue clear liquids today  The Hemovac drains until ambulating    Hernesto Leyva MD      Interval History   Little trouble sleeping last night, using PCA, adequate pain control  No flatus yet, had one episode of nausea with emesis  Denies any numbness or tingling in his feet or leg pain  Dangled but has not ambulated yet.  Has a brace.    Physical Exam   Temp: 100  F (37.8  C) Temp src: Oral BP: 114/51 Pulse: 98 Heart Rate: 93 Resp: 20 SpO2: 93 % O2 Device: None (Room air) Oxygen Delivery: 2 LPM  There were no vitals filed for this visit.  Vital Signs with Ranges  Temp:  [98.1  F (36.7  C)-100.4  F (38  C)] 100  F (37.8  C)  Pulse:  [98] 98  Heart Rate:  [] 93  Resp:  [13-31] 20  BP: (106-164)/() 114/51  SpO2:  [93 %-99 %] 93 %  I/O last 3 completed shifts:  In: 2160 [P.O.:60; I.V.:2100]  Out: 2675 [Urine:2255; Emesis/NG output:25; Drains:295; Blood:100]    Alert, Oriented  Abd: S,NT,ND  Wound / Dressing: Clean, dry, and intact  CMS is intact bilateral lower extremities  Hemovac drain #110 mL, Hemovac drain number 2:15 mL    Medications     HYDROmorphone       sodium chloride 75 mL/hr at 10/08/18 1417        loratadine  10 mg Oral Daily     magnesium citrate  148 mL Oral Once     senna-docusate  1 tablet Oral BID    Or     senna-docusate  2 tablet Oral BID     sodium chloride (PF)  3 mL Intracatheter Q8H       Data   No lab results found in last 7 days.    No results found for this or any previous visit (from the past 24  hour(s)).

## 2018-10-09 NOTE — PROGRESS NOTES
" 10/09/18 1100   Quick Adds   Type of Visit Initial PT Evaluation   Living Environment   Lives With alone   Living Arrangements apartment   Home Accessibility stairs to enter home;stairs within home   Number of Stairs to Enter Home 3   Number of Stairs Within Home 8   Stair Railings at Home outside, present on right side;inside, present on right side   Self-Care   Dominant Hand right   Usual Activity Tolerance good   Current Activity Tolerance poor   Functional Level Prior   Ambulation 0-->independent   Transferring 0-->independent   Toileting 0-->independent   Bathing 0-->independent   Dressing 0-->independent   Eating 0-->independent   Communication 0-->understands/communicates without difficulty   Swallowing 0-->swallows foods/liquids without difficulty   Cognition 0 - no cognition issues reported   Fall history within last six months no   Prior Functional Level Comment Pt has a SEC and 4WW   General Information   Onset of Illness/Injury or Date of Surgery - Date 10/08/18   Referring Physician Jelena Cotto, YUMIKO   Patient/Family Goals Statement \"Go home\"   Pertinent History of Current Problem (include personal factors and/or comorbidities that impact the POC) 63 YOM diagnosed with L5-S1 degenerative spondy with recurrent synovial cyst and R S1 nerve root compression. S/p L5-S1 PSF with revision decompression L5-S1 and synovial cyst excision. PMH: ACDF 2016.   Precautions/Limitations fall precautions;spinal precautions   Weight-Bearing Status - LLE full weight-bearing   Weight-Bearing Status - RLE full weight-bearing   General Observations Pleasant and cooperative   General Info Comments Activity: up with assist   Cognitive Status Examination   Orientation orientation to person, place and time   Level of Consciousness alert   Follows Commands and Answers Questions 100% of the time;able to follow multistep instructions   Personal Safety and Judgment intact   Memory intact   Pain Assessment   Patient Currently in " "Pain Yes, see Vital Sign flowsheet  (5/10 low back)   Posture    Posture Forward head position;Protracted shoulders   Range of Motion (ROM)   ROM Comment BLEs WFL   Strength   Strength Comments BLEs grossly 5/5 throughout   Bed Mobility   Bed Mobility Comments Supine>sit with modA   Transfer Skills   Transfer Comments Sit>stand from EOB to FWW with modA   Gait   Gait Comments Gait with FWW x 5' with Milton, 3 point pattern, decreased step length B, flexed posture   Balance   Balance Comments Good sitting with B UE support, requires UE support for standing   Sensory Examination   Sensory Perception no deficits were identified   Sensory Perception Comments reports R LE symptoms improved compared to before surgery   Coordination   Coordination no deficits were identified   General Therapy Interventions   Planned Therapy Interventions balance training;bed mobility training;gait training;transfer training;home program guidelines;progressive activity/exercise   Intervention Comments posture and body mechanics education   Clinical Impression   Criteria for Skilled Therapeutic Intervention yes, treatment indicated   PT Diagnosis Impaired gait   Influenced by the following impairments pain, fatigue   Functional limitations due to impairments bed mobility, transfers, gait   Clinical Presentation Stable/Uncomplicated   Clinical Presentation Rationale see above   Clinical Decision Making (Complexity) Low complexity   Therapy Frequency` 2 times/day   Predicted Duration of Therapy Intervention (days/wks) 3 days   Anticipated Discharge Disposition Home with Assist   Risk & Benefits of therapy have been explained Yes   Patient, Family & other staff in agreement with plan of care Yes   Clinical Impression Comments Pt reports that he is getting help to arrange for a PCA to assist with household tasks as he lives alone   [facility] AM-PAC TM \"6 Clicks\"   2016, Trustees of [facility], under license to [employer].  All " "rights reserved.   6 Clicks Short Forms Basic Mobility Inpatient Short Form   [facility] AM-PAC  \"6 Clicks\" V.2 Basic Mobility Inpatient Short Form   1. Turning from your back to your side while in a flat bed without using bedrails? 3 - A Little   2. Moving from lying on your back to sitting on the side of a flat bed without using bedrails? 2 - A Lot   3. Moving to and from a bed to a chair (including a wheelchair)? 2 - A Lot   4. Standing up from a chair using your arms (e.g., wheelchair, or bedside chair)? 2 - A Lot   5. To walk in hospital room? 3 - A Little   6. Climbing 3-5 steps with a railing? 2 - A Lot   Basic Mobility Raw Score (Score out of 24.Lower scores equate to lower levels of function) 14   Total Evaluation Time   Total Evaluation Time (Minutes) 15     "

## 2018-10-09 NOTE — PLAN OF CARE
Problem: Patient Care Overview  Goal: Plan of Care/Patient Progress Review  Outcome: No Change  A&Ox4. CMS intact. Bowel sounds hypo, - flatus. VSS. Dressing CDI. JPsX2 CDI, 1 = 10 and 2 = 15. Pt has not dangled yet, but anticipate up w/ A1 w/ GB. C/o back pain, decreased with PCA. Plan to encourage activity and pain control, nursing will continue to monitor.

## 2018-10-09 NOTE — PLAN OF CARE
Problem: Patient Care Overview  Goal: Plan of Care/Patient Progress Review  Outcome: No Change  A&O x4. VSS on 2LPM nasal cannula except temp 99.9 encouraged IS and cough/deep breathing.  CMS intact. Strong throughout. Bowel sounds hypoactive, -flatus. Tolerated small sips of clears with one episode of emesis, Zofran given with relief.  Dressing CDI. KOFI patent, #2 putting out 130 CC, ice applied (MD aware). Turn and reposition. Log rolled. Brace fitted for ambulation. PCA for incisional pain. CAPNO WDL. Plan to keep sánchez in and continue on clear liquids per MD. Nursing will continue to monitor.

## 2018-10-09 NOTE — PLAN OF CARE
Problem: Patient Care Overview  Goal: Plan of Care/Patient Progress Review  PT: PT orders received, initial eval completed and treatment initiated. Patient lives alone in an apartment with stairs to enter and to access his unit. Previously independent with all ADLs and mobility without a device. He dos have a SEC and 4WW. Pt presents s/p L5-S1 PSF and revision decompression with synovial cyst excision.    Discharge Planner PT   Patient plan for discharge: home with PCA assist for household tasks  Current status: Instructed pt in use of LS corset, spine precautions and log roll technique. Pt requires modA for donning LS corset, for bed mobility and sit<>stand transfers with a FWW. Pt requires Milton for transfers to a chair once standing and gait x 5' with a FWW. Mobility limited by nausea this session, no emesis, RN informed. Provided handout and education regarding proper posture, positioning and body mechanics with functional tasks.  Barriers to return to prior living situation: pain, level of assist required, limited mobility  Recommendations for discharge: home with assist for household tasks  Rationale for recommendations: Anticipate pt will progress to modified independence with mobility as his pain and nausea improve. Will update disch recs and mobility is further assessed.       Entered by: Oliva Wall 10/09/2018 12:14 PM

## 2018-10-10 ENCOUNTER — APPOINTMENT (OUTPATIENT)
Dept: PHYSICAL THERAPY | Facility: CLINIC | Age: 63
DRG: 460 | End: 2018-10-10
Attending: ORTHOPAEDIC SURGERY
Payer: COMMERCIAL

## 2018-10-10 ENCOUNTER — APPOINTMENT (OUTPATIENT)
Dept: OCCUPATIONAL THERAPY | Facility: CLINIC | Age: 63
DRG: 460 | End: 2018-10-10
Attending: PHYSICIAN ASSISTANT
Payer: COMMERCIAL

## 2018-10-10 LAB
GLUCOSE BLDC GLUCOMTR-MCNC: 109 MG/DL (ref 70–99)
HGB BLD-MCNC: 10.5 G/DL (ref 13.3–17.7)

## 2018-10-10 PROCEDURE — 40000193 ZZH STATISTIC PT WARD VISIT

## 2018-10-10 PROCEDURE — 00000146 ZZHCL STATISTIC GLUCOSE BY METER IP

## 2018-10-10 PROCEDURE — 97165 OT EVAL LOW COMPLEX 30 MIN: CPT | Mod: GO

## 2018-10-10 PROCEDURE — 40000141 ZZH STATISTIC PERIPHERAL IV START W/O US GUIDANCE

## 2018-10-10 PROCEDURE — 97535 SELF CARE MNGMENT TRAINING: CPT | Mod: GO

## 2018-10-10 PROCEDURE — 40000133 ZZH STATISTIC OT WARD VISIT

## 2018-10-10 PROCEDURE — 25000125 ZZHC RX 250: Performed by: ORTHOPAEDIC SURGERY

## 2018-10-10 PROCEDURE — 25000132 ZZH RX MED GY IP 250 OP 250 PS 637: Performed by: PHYSICIAN ASSISTANT

## 2018-10-10 PROCEDURE — 97116 GAIT TRAINING THERAPY: CPT | Mod: GP

## 2018-10-10 PROCEDURE — 85018 HEMOGLOBIN: CPT | Performed by: PHYSICIAN ASSISTANT

## 2018-10-10 PROCEDURE — 36415 COLL VENOUS BLD VENIPUNCTURE: CPT | Performed by: PHYSICIAN ASSISTANT

## 2018-10-10 PROCEDURE — 97530 THERAPEUTIC ACTIVITIES: CPT | Mod: GO

## 2018-10-10 PROCEDURE — 12000000 ZZH R&B MED SURG/OB

## 2018-10-10 PROCEDURE — 97530 THERAPEUTIC ACTIVITIES: CPT | Mod: GP

## 2018-10-10 RX ORDER — CLINDAMYCIN PHOSPHATE 900 MG/50ML
900 INJECTION, SOLUTION INTRAVENOUS EVERY 8 HOURS
Status: COMPLETED | OUTPATIENT
Start: 2018-10-10 | End: 2018-10-10

## 2018-10-10 RX ADMIN — SENNOSIDES AND DOCUSATE SODIUM 2 TABLET: 8.6; 5 TABLET ORAL at 22:05

## 2018-10-10 RX ADMIN — OXYCODONE HYDROCHLORIDE 10 MG: 5 TABLET ORAL at 23:47

## 2018-10-10 RX ADMIN — OXYCODONE HYDROCHLORIDE 10 MG: 5 TABLET ORAL at 19:40

## 2018-10-10 RX ADMIN — OXYCODONE HYDROCHLORIDE 10 MG: 5 TABLET ORAL at 08:26

## 2018-10-10 RX ADMIN — SENNOSIDES AND DOCUSATE SODIUM 2 TABLET: 8.6; 5 TABLET ORAL at 10:31

## 2018-10-10 RX ADMIN — LORATADINE 10 MG: 10 TABLET ORAL at 10:31

## 2018-10-10 RX ADMIN — OXYCODONE HYDROCHLORIDE 10 MG: 5 TABLET ORAL at 13:30

## 2018-10-10 RX ADMIN — CLINDAMYCIN IN 5 PERCENT DEXTROSE 900 MG: 18 INJECTION, SOLUTION INTRAVENOUS at 14:30

## 2018-10-10 RX ADMIN — CLINDAMYCIN IN 5 PERCENT DEXTROSE 900 MG: 18 INJECTION, SOLUTION INTRAVENOUS at 22:06

## 2018-10-10 ASSESSMENT — ACTIVITIES OF DAILY LIVING (ADL)
ADLS_ACUITY_SCORE: 8
ADLS_ACUITY_SCORE: 9
ADLS_ACUITY_SCORE: 8
ADLS_ACUITY_SCORE: 8
ADLS_ACUITY_SCORE: 9
ADLS_ACUITY_SCORE: 8
PREVIOUS_RESPONSIBILITIES: MEAL PREP;HOUSEKEEPING;LAUNDRY;SHOPPING;DRIVING;MEDICATION MANAGEMENT

## 2018-10-10 NOTE — PROGRESS NOTES
[facility]    Spine Surgery  Daily Post-Op Note    Assessment & Plan   Procedure(s):  COMBINED DECOMPRESSION, FUSION LUMBAR POSTERIOR ONE LEVEL  GRAFT BONE FROM ILIAC CREST   -2 Days Post-Op      ASSESSMENT:  Stable postoperative day #2, status post revision decompression, excision of synovial cyst and posterior spinal fusion L5-S1       PLAN:  Discontinue PCA and start PO pain meds  ambulation  Advance diet if + flatus      Hernesto Leyva MD      Interval History   Trouble with pain control but also he is anxious about getting to much with history of drug abuse.  Legs ok,  Some flatus this AM      Physical Exam   Temp: 98.8  F (37.1  C) Temp src: Oral BP: 131/85   Heart Rate: 94 Resp: 18 SpO2: 94 % O2 Device: None (Room air) Oxygen Delivery: 2 LPM  There were no vitals filed for this visit.  Vital Signs with Ranges  Temp:  [98.8  F (37.1  C)-100.4  F (38  C)] 98.8  F (37.1  C)  Heart Rate:  [80-95] 94  Resp:  [16-18] 18  BP: (110-136)/(67-92) 131/85  SpO2:  [94 %-99 %] 94 %  I/O last 3 completed shifts:  In: 2205 [P.O.:1140; I.V.:1065]  Out: 2330 [Urine:2100; Drains:230]    Alert, Oriented  Abd: S,NT,ND  Wound / Dressing: Clean, dry, and intact  Cms intact  Hemovac 30 deep and 20 superficial  Medications     HYDROmorphone Stopped (10/10/18 0652)     sodium chloride Stopped (10/10/18 0652)        loratadine  10 mg Oral Daily     magnesium citrate  148 mL Oral Once     senna-docusate  1 tablet Oral BID    Or     senna-docusate  2 tablet Oral BID     sodium chloride (PF)  3 mL Intracatheter Q8H       Data     Recent Labs  Lab 10/10/18  0830 10/09/18  0755   HGB 10.5* 11.6*       No results found for this or any previous visit (from the past 24 hour(s)).

## 2018-10-10 NOTE — PLAN OF CARE
Problem: Patient Care Overview  Goal: Plan of Care/Patient Progress Review  Outcome: No Change  A&Ox4. CMS intact. Bowel sounds +, - flatus. VSS, ex mildly elevated temp, pulmonary hygiene encouraged. Dressing was reinforced w/ more tape d/t tape ripping/rolling. JPx2, CDI. Up with A1 w/ GB and walker. C/o 8/10 back pain, but pt apprehensive to use PCA for pain management. He states that he has hx of dependency issues and would like to avoid narcotics. Discharge plan pending, TCU recommended, nursing will continue to monitor.

## 2018-10-10 NOTE — PLAN OF CARE
Problem: Patient Care Overview  Goal: Plan of Care/Patient Progress Review  Discharge Planner PT   Patient plan for discharge: home with PCA assist for household tasks  Current status: Patient sleeping upon arrival of therapy; agreeable to therapy. Rates pain in low back 3/10. Able to recall post op precautions. Mod A for donning brace. Completed supine to sit, using log roll technique with SBA. Sit to stand transfer to FWW completed with Min A x 1. Ambulated 150 ft x 1 with FWW and CGA for safety. Patient with nursing at end of session.  Barriers to return to prior living situation: pain, level of assist required, limited mobility  Recommendations for discharge: home with assist for household tasks per plan established by the PT.  Rationale for recommendations: Anticipate pt will progress to modified independence with mobility        Entered by: Paula Craft 10/10/2018 10:51 AM

## 2018-10-10 NOTE — PLAN OF CARE
Problem: Patient Care Overview  Goal: Plan of Care/Patient Progress Review  Outcome: Improving  Angry this morning, voicing frustration with staff, procedures, and pain control. Changed to PO oxycodone prn and now experiencing improved pain control, rating pain in back as 3/10 with some pain radiating into R buttock. Cooperative with therapies, ambulated in hallway x2 with 1, belt, and walker. Brace on when OOB. Dressing changed, steri strips intact, new dressing CDI. Drains discontinued. Small voids since Noe discontinued, PVR = 19. Tolerating full liquid diet, BS active and passing flatus. VSS, on RA.

## 2018-10-10 NOTE — PLAN OF CARE
Problem: Patient Care Overview  Goal: Plan of Care/Patient Progress Review  Discharge Planner PT   Patient plan for discharge: pt would like to go home but does not have any assist available   Current status: Order received, chart reviewed, eval completed, tx initiated. Pt seen POD 2 s/p revision decompression, excision of synovial cyst and posterior spinal fusion L5-S1. Pt lives in apartment alone and reports I with all ADL/IADL's prior including driving. Pt owns cane and 4ww but does not typically use. Pt does not have assist available at home at discharge.  SBA for bed mobility supine <> sit. Increased time due to pain. Pt sat EOB x 8 minutes independently. OT educated on spinal precautions. Pt able to recall 2/3 precautions. OT educated on maintaining spinal precautions within home environment. Pt receptive to education. CGA for functional ambulation with FWW into bathroom. SBA for toilet transer with use of commode chair with arm rests over toilet. No LOB noted with ambulation or transfers. OT educated and demonstrated use of AE for LE dressing to maintain spinal precautions. Pt able to don/doff socks and pants with use of AE and verbal cues for technique. Pt able to don LSO with SBA and verbal cues.   Barriers to return to prior living situation: lives alone without any assist at discharge   Recommendations for discharge: Pt would be safe to return home if he has assist available. If not, may need TCU stay to improve independence with ADL's and transfers.   Rationale for recommendations: Pt reports he does not have assist available at discharge and may need TCU stay as he will need assistance with ADL and IADL's. Pt reports he does not feel safe to return home alone at this time.        Entered by: Barb Rodriguez 10/10/2018 3:00 PM

## 2018-10-10 NOTE — PLAN OF CARE
Problem: Patient Care Overview  Goal: Plan of Care/Patient Progress Review  Outcome: Improving  A&Ox4. CMS: RLE slightly weaker that LLE otherwise intact pt states its baseline. VSS. Requiring 2L O2 via NC when sleeping. Dressing CDI. KOFI 50 and 20 ml. Clear liquid diet. Up with A1 pivot to chair with PT on days. Noe patent. BS active, denies flatus. C/o back pain, decreased with PCA dilaudid. Denies nausea. Discharge pending.

## 2018-10-10 NOTE — PROGRESS NOTES
[place] - Suicide Severity Rating Scale Completed? Yes  If yes, what color did the patient score? White

## 2018-10-10 NOTE — PROGRESS NOTES
10/10/18 1425   Quick Adds   Type of Visit Initial Occupational Therapy Evaluation   Living Environment   Lives With alone   Living Arrangements apartment   Home Accessibility bed and bath on same level;stairs to enter home;tub/shower is not walk in   Number of Stairs to Enter Home 3   Number of Stairs Within Home 8   Transportation Available car   Living Environment Comment lives in apartment. 8 steps to get down to apartment. tub shower no grab bars or chair. standard height toilets    Self-Care   Dominant Hand right   Usual Activity Tolerance good   Current Activity Tolerance fair   Equipment Currently Used at Home cane, straight;walker, rolling   Activity/Exercise/Self-Care Comment has cane and 4ww. uses can occassionally    Functional Level Prior   Ambulation 0-->independent   Transferring 0-->independent   Toileting 0-->independent   Bathing 0-->independent   Dressing 0-->independent   Eating 0-->independent   Communication 0-->understands/communicates without difficulty   Swallowing 0-->swallows foods/liquids without difficulty   Cognition 0 - no cognition issues reported   Fall history within last six months no   Prior Functional Level Comment I with all ADL/IADL's   General Information   Onset of Illness/Injury or Date of Surgery - Date 10/08/18   Referring Physician Gordon   Patient/Family Goals Statement knows he cannot return home but does not know where he will go   Additional Occupational Profile Info/Pertinent History of Current Problem postoperative day #2, status post revision decompression, excision of synovial cyst and posterior spinal fusion L5-S1   Precautions/Limitations fall precautions;spinal precautions   Cognitive Status Examination   Orientation orientation to person, place and time   Level of Consciousness alert   Able to Follow Commands WNL/WFL   Personal Safety (Cognitive) WNL/WFL   Memory intact   Visual Perception   Visual Perception No deficits were identified   Sensory Examination    Sensory Quick Adds No deficits were identified   Pain Assessment   Patient Currently in Pain Yes, see Vital Sign flowsheet  (2/10 at rest)   Integumentary/Edema   Integumentary/Edema no deficits were identifed   Posture   Posture not impaired   Range of Motion (ROM)   ROM Quick Adds No deficits were identified   Strength   Manual Muscle Testing Quick Adds No deficits were identified   Muscle Tone Assessment   Muscle Tone Quick Adds No deficits were identified   Coordination   Upper Extremity Coordination No deficits were identified   Mobility   Bed Mobility Bed mobility skill: Sit to supine;Bed mobility skill: Supine to sit   Bed Mobility Skill: Sit to Supine   Level of [place]: Sit/Supine stand-by assist   Bed Mobility Skill: Supine to Sit   Level of [place]: Supine/Sit stand-by assist   Assistive Device: Supine/Sit bedrail   Transfer Skill: Sit to Stand   Level of [place]: Sit/Stand contact guard   Assistive Device for Transfer: Sit/Stand standard walker   Transfer Skill: Toilet Transfer   Level of [place]: Toilet contact guard   Assistive Device standard walker;grab bars;seat riser   Upper Body Dressing   Level of [place]: Dress Upper Body minimum assist (75% patients effort)   Lower Body Dressing   Level of [place]: Dress Lower Body maximum assist (25% patients effort)   Grooming   Level of [place]: Grooming independent   Eating/Self Feeding   Level of [place]: Eating independent   Instrumental Activities of Daily Living (IADL)   Previous Responsibilities meal prep;housekeeping;laundry;shopping;driving;medication management   Activities of Daily Living Analysis   Impairments Contributing to Impaired Activities of Daily Living balance impaired;pain;post surgical precautions   General Therapy Interventions   Planned Therapy Interventions ADL retraining;transfer training   Clinical Impression   Criteria for Skilled Therapeutic Interventions Met yes, treatment indicated   OT  "Diagnosis decreased I with ADL/IADL's and functional transfers   Influenced by the following impairments pain, post op precautuions   Assessment of Occupational Performance 1-3 Performance Deficits   Identified Performance Deficits LE dressing, toileting, toilet transfer   Clinical Decision Making (Complexity) Low complexity   Therapy Frequency daily   Predicted Duration of Therapy Intervention (days/wks) 2 days   Anticipated Discharge Disposition Home with Assist;Transitional Care Facility   Risks and Benefits of Treatment have been explained. Yes   Patient, Family & other staff in agreement with plan of care Yes   [facility] TM \"6 Clicks\"   2016, Trustees of [facility], under license to [email or web address].  All rights reserved.   6 Clicks Short Forms Daily Activity Inpatient Short Form   [facility]  \"6 Clicks\" Daily Activity Inpatient Short Form   1. Putting on and taking off regular lower body clothing? 2 - A Lot   2. Bathing (including washing, rinsing, drying)? 2 - A Lot   3. Toileting, which includes using toilet, bedpan or urinal? 3 - A Little   4. Putting on and taking off regular upper body clothing? 3 - A Little   5. Taking care of personal grooming such as brushing teeth? 4 - None   6. Eating meals? 4 - None   Daily Activity Raw Score (Score out of 24.Lower scores equate to lower levels of function) 18   Total Evaluation Time   Total Evaluation Time (Minutes) 8     "

## 2018-10-10 NOTE — PLAN OF CARE
Problem: Patient Care Overview  Goal: Plan of Care/Patient Progress Review  Outcome: Improving  A and O x4, forgetful. Needing reminders to use his PCA pump, education provided regarding staying on top of pain control. Rating pain in lower back as 5-6/10, some relief from PCA along with repositioning and ice packs. Dressing CDI. Drains with 10 and 20 mL bright red drainage, deeper drain also had an estimated extra output of 30 mL left unmeasured when the drain leaked. Some R buttock pain when OOB, transferred x2 from bed to chair with assist of 1, belt, and walker. Brace on when OOB. Elevated temperature with max temp of 100, VS otherwise stable. Now on RA with oxygen saturations in the mid 90s. Hypoactive BS, nausea earlier today now resolved. Passing flatus. Tolerated clear liquids, advanced with caution to full liquid diet. Sánchez with adequate clear yellow urine output, MD instructions to discontinue sánchez when pt tolerating ambulation x2.

## 2018-10-11 ENCOUNTER — APPOINTMENT (OUTPATIENT)
Dept: PHYSICAL THERAPY | Facility: CLINIC | Age: 63
DRG: 460 | End: 2018-10-11
Attending: ORTHOPAEDIC SURGERY
Payer: COMMERCIAL

## 2018-10-11 ENCOUNTER — APPOINTMENT (OUTPATIENT)
Dept: OCCUPATIONAL THERAPY | Facility: CLINIC | Age: 63
DRG: 460 | End: 2018-10-11
Attending: ORTHOPAEDIC SURGERY
Payer: COMMERCIAL

## 2018-10-11 LAB — HGB BLD-MCNC: 11 G/DL (ref 13.3–17.7)

## 2018-10-11 PROCEDURE — 97116 GAIT TRAINING THERAPY: CPT | Mod: GP

## 2018-10-11 PROCEDURE — 25000132 ZZH RX MED GY IP 250 OP 250 PS 637: Performed by: ORTHOPAEDIC SURGERY

## 2018-10-11 PROCEDURE — 97530 THERAPEUTIC ACTIVITIES: CPT | Mod: GO | Performed by: OCCUPATIONAL THERAPY ASSISTANT

## 2018-10-11 PROCEDURE — 36415 COLL VENOUS BLD VENIPUNCTURE: CPT | Performed by: PHYSICIAN ASSISTANT

## 2018-10-11 PROCEDURE — 97535 SELF CARE MNGMENT TRAINING: CPT | Mod: GO | Performed by: OCCUPATIONAL THERAPY ASSISTANT

## 2018-10-11 PROCEDURE — 25000132 ZZH RX MED GY IP 250 OP 250 PS 637: Performed by: PHYSICIAN ASSISTANT

## 2018-10-11 PROCEDURE — 12000000 ZZH R&B MED SURG/OB

## 2018-10-11 PROCEDURE — 40000133 ZZH STATISTIC OT WARD VISIT: Performed by: OCCUPATIONAL THERAPY ASSISTANT

## 2018-10-11 PROCEDURE — 40000193 ZZH STATISTIC PT WARD VISIT

## 2018-10-11 PROCEDURE — 85018 HEMOGLOBIN: CPT | Performed by: PHYSICIAN ASSISTANT

## 2018-10-11 RX ORDER — BISACODYL 10 MG
10 SUPPOSITORY, RECTAL RECTAL DAILY PRN
Status: DISCONTINUED | OUTPATIENT
Start: 2018-10-11 | End: 2018-10-12 | Stop reason: HOSPADM

## 2018-10-11 RX ADMIN — SENNOSIDES AND DOCUSATE SODIUM 2 TABLET: 8.6; 5 TABLET ORAL at 09:21

## 2018-10-11 RX ADMIN — OXYCODONE HYDROCHLORIDE 10 MG: 5 TABLET ORAL at 14:17

## 2018-10-11 RX ADMIN — OXYCODONE HYDROCHLORIDE 10 MG: 5 TABLET ORAL at 19:50

## 2018-10-11 RX ADMIN — BISACODYL 10 MG: 10 SUPPOSITORY RECTAL at 09:21

## 2018-10-11 RX ADMIN — SENNOSIDES AND DOCUSATE SODIUM 2 TABLET: 8.6; 5 TABLET ORAL at 21:55

## 2018-10-11 RX ADMIN — OXYCODONE HYDROCHLORIDE 10 MG: 5 TABLET ORAL at 08:37

## 2018-10-11 RX ADMIN — LORATADINE 10 MG: 10 TABLET ORAL at 09:22

## 2018-10-11 ASSESSMENT — ACTIVITIES OF DAILY LIVING (ADL)
ADLS_ACUITY_SCORE: 8

## 2018-10-11 NOTE — PROGRESS NOTES
"D: SW following for discharge planning.   I: SW met with pt to discuss TCU. Pt reports his goal is to return to his apartment upon discharge where he lives alone. Pt states he was homeless for over two years and he is anxious to return to his apartment and return independent living. Does not want to consider a TCU. Pt believes his PCA services will be arranged in \"2-3 days\". Even though previous SW notes state \"2-3 weeks.\" Pt states he knows people who will perform this work for him. SW left a voicemail for his CMYue asking for an update on PCA services. Attempted to call dtr, but phone number did not work.   P: SW will continue to follow.     Beatriz Luevano, CHARLA, [facility]  [phone]    Addendum: Voicemail from VENUS White CM, [phone]. She confirms it will take several weeks for pt's PCA services to be put in to place. Sona believes pt will benefit from TCU in the meantime. SW asked her to call pt and discuss this in a return voicemail.   "

## 2018-10-11 NOTE — PLAN OF CARE
Problem: Patient Care Overview  Goal: Plan of Care/Patient Progress Review  Outcome: Improving  A&OX4, VSS on RA. C/o pain in the lower back at the surgical site. Received a dose of 10 mg of Oxycodone with some relief. Voiding in small amounts, PVR <300 mL/hr. BS+, could advance diet to regular. IV saline locked. Up with AX1 with gaitbelt and walker. CMS intact. Continue to monitor.

## 2018-10-11 NOTE — PLAN OF CARE
Problem: Patient Care Overview  Goal: Plan of Care/Patient Progress Review  Outcome: Declining      No events overnight. PRN oxycodone effective for back pain. VSS on room air. CMS unchanged. Dressing C,D,I. Flatus +.  Declined PCDs. Using urinal in bed, otherwise up with  A1.

## 2018-10-11 NOTE — PROGRESS NOTES
[facility]    Spine Surgery  Daily Post-Op Note    Assessment & Plan   Procedure(s):  COMBINED DECOMPRESSION, FUSION LUMBAR POSTERIOR ONE LEVEL  GRAFT BONE FROM ILIAC CREST   -3 Days Post-Op      ASSESSMENT:  Postop day #3, status post revision decompression and posterior spinal fusion L5-S1.    PLAN:  Plan to use constipation protocol, either suppository or Fleet's enema today  Advance diet as tolerated to regular  Continue ambulation and mobilization    Hernesto Leyva MD      Interval History   No problems overnight, his on full liquids yesterday, passing flatus now, pain controlled with p.o. pain meds.  No bowel movement.  Feels constipated.    Physical Exam   Temp: 98.3  F (36.8  C) Temp src: Axillary BP: 125/75   Heart Rate: 86 Resp: 16 SpO2: 97 % O2 Device: None (Room air)    Vitals:    10/11/18 0616   Weight: 67.7 kg (149 lb 3.2 oz)     Vital Signs with Ranges  Temp:  [98.3  F (36.8  C)-99.4  F (37.4  C)] 98.3  F (36.8  C)  Heart Rate:  [85-96] 86  Resp:  [16-18] 16  BP: (112-137)/(61-82) 125/75  SpO2:  [94 %-97 %] 97 %  I/O last 3 completed shifts:  In: 360 [P.O.:360]  Out: 985 [Urine:975; Drains:10]    Alert, Oriented  Abd: S,NT,ND  Wound / Dressing: Clean, dry, and intact  Motor: Normal motor strength  Sensory: Normal  Vascular: No edema  Hemovac drains are out    Medications        loratadine  10 mg Oral Daily     magnesium citrate  148 mL Oral Once     senna-docusate  1 tablet Oral BID    Or     senna-docusate  2 tablet Oral BID     sodium chloride (PF)  3 mL Intracatheter Q8H       Data     Recent Labs  Lab 10/10/18  0830 10/09/18  0755   HGB 10.5* 11.6*       No results found for this or any previous visit (from the past 24 hour(s)).

## 2018-10-11 NOTE — PLAN OF CARE
Problem: Patient Care Overview  Goal: Plan of Care/Patient Progress Review  Discharge Planner PT   Patient plan for discharge: home with PCA assist for household tasks  Current status: Patient sleeping upon arrival of therapy; agreeable to therapy after encouragement. Completed supine <> sit with CGA and extra time. Min A for donning brace. Sit to stand transfer to FWW with Min A x 1 and extra time to complete upright posture; cues to avoid bending needed. Ambulated 200 ft x 1 with FWW and SBA; cues for upright posture frequently. Returned to bed at end of session.   Barriers to return to prior living situation: pain, level of assist required, limited mobility  Recommendations for discharge: home with assist for household tasks per plan established by the PT.  Rationale for recommendations: Anticipate pt will progress to modified independence with mobility        Entered by: Paula Craft 10/11/2018 8:54 AM

## 2018-10-11 NOTE — PLAN OF CARE
Problem: Patient Care Overview  Goal: Plan of Care/Patient Progress Review  Outcome: Improving  A and O x4. Labile affect, angry at times. CMS/NV intact. Dressing changed, CDI; steri strips intact. No drainage. Up with 1, belt, and walker; ambulated in hallway x2. Declined to be up to chair for meals. Not consistently using his light to call for assistance--OOB without staff assist or his brace x1. Suppository given this morning with small formed BM resulting, passing flatus. Voiding without difficulty, low PVR. No nausea, fair appetite on regular diet. VSS, on RA. Pain in lower back rated between 3-6/10, taking oxycodone and using ice along with repositioning. Possible discharge tomorrow either to home with support from family/friends or to TCU per therapy notes.

## 2018-10-11 NOTE — PLAN OF CARE
Problem: Patient Care Overview  Goal: Plan of Care/Patient Progress Review  Discharge Planner OT   Patient plan for discharge: pt would like to go home but does not have any assist available   Current status: bed mobility supine to sit SBA. Pt recalled 3/3 spinal precautions. Donned LSO Ind. Don/doff of slipper socks Mod Ind with AE,  amb to/from bathroom with FWW into bathroom SBA. SBA for toilet transer with RTS. Standing at sink ADLS, stand to sit EOB SBA, sit to supine SBA.  Barriers to return to prior living situation: lives alone without any assist at discharge   Recommendations for discharge: Pt would be safe to return home if he has assist available. If not, may need TCU stay to improve independence with ADL's and transfers per plan established by the Occupational therapist.   Rationale for recommendations: Pt reports he does not have assist available at discharge and may need TCU stay as he will need assistance with ADL and IADL's. Pt reports he does not feel safe to return home alone at this time.              Entered by: Ivone De La Garza 10/11/2018 12:18 PM

## 2018-10-12 ENCOUNTER — RECORDS - HEALTHEAST (OUTPATIENT)
Dept: ADMINISTRATIVE | Facility: OTHER | Age: 63
End: 2018-10-12

## 2018-10-12 ENCOUNTER — APPOINTMENT (OUTPATIENT)
Dept: OCCUPATIONAL THERAPY | Facility: CLINIC | Age: 63
DRG: 460 | End: 2018-10-12
Attending: ORTHOPAEDIC SURGERY
Payer: COMMERCIAL

## 2018-10-12 ENCOUNTER — APPOINTMENT (OUTPATIENT)
Dept: PHYSICAL THERAPY | Facility: CLINIC | Age: 63
DRG: 460 | End: 2018-10-12
Attending: ORTHOPAEDIC SURGERY
Payer: COMMERCIAL

## 2018-10-12 VITALS
OXYGEN SATURATION: 95 % | BODY MASS INDEX: 21.46 KG/M2 | HEIGHT: 70 IN | DIASTOLIC BLOOD PRESSURE: 78 MMHG | TEMPERATURE: 99.1 F | SYSTOLIC BLOOD PRESSURE: 119 MMHG | WEIGHT: 149.9 LBS | RESPIRATION RATE: 16 BRPM | HEART RATE: 84 BPM

## 2018-10-12 PROCEDURE — 97116 GAIT TRAINING THERAPY: CPT | Mod: GP

## 2018-10-12 PROCEDURE — 97530 THERAPEUTIC ACTIVITIES: CPT | Mod: GO | Performed by: OCCUPATIONAL THERAPY ASSISTANT

## 2018-10-12 PROCEDURE — 97535 SELF CARE MNGMENT TRAINING: CPT | Mod: GO | Performed by: OCCUPATIONAL THERAPY ASSISTANT

## 2018-10-12 PROCEDURE — 25000132 ZZH RX MED GY IP 250 OP 250 PS 637: Performed by: PHYSICIAN ASSISTANT

## 2018-10-12 PROCEDURE — 40000193 ZZH STATISTIC PT WARD VISIT

## 2018-10-12 PROCEDURE — 40000133 ZZH STATISTIC OT WARD VISIT: Performed by: OCCUPATIONAL THERAPY ASSISTANT

## 2018-10-12 RX ORDER — OXYCODONE HYDROCHLORIDE 5 MG/1
5-10 TABLET ORAL
Qty: 50 TABLET | Refills: 0 | Status: SHIPPED | OUTPATIENT
Start: 2018-10-12

## 2018-10-12 RX ADMIN — OXYCODONE HYDROCHLORIDE 10 MG: 5 TABLET ORAL at 03:52

## 2018-10-12 RX ADMIN — OXYCODONE HYDROCHLORIDE 10 MG: 5 TABLET ORAL at 13:14

## 2018-10-12 RX ADMIN — OXYCODONE HYDROCHLORIDE 10 MG: 5 TABLET ORAL at 17:40

## 2018-10-12 RX ADMIN — OXYCODONE HYDROCHLORIDE 10 MG: 5 TABLET ORAL at 08:39

## 2018-10-12 RX ADMIN — LORATADINE 10 MG: 10 TABLET ORAL at 08:39

## 2018-10-12 RX ADMIN — SENNOSIDES AND DOCUSATE SODIUM 2 TABLET: 8.6; 5 TABLET ORAL at 08:39

## 2018-10-12 ASSESSMENT — ACTIVITIES OF DAILY LIVING (ADL)
ADLS_ACUITY_SCORE: 8

## 2018-10-12 NOTE — PLAN OF CARE
Discharge paperwork gone over with pt and questions answered. Sent with belongings and medications.

## 2018-10-12 NOTE — DISCHARGE INSTRUCTIONS
Care after a Spinal Fusion - Dr. Hernesto Leyva      The following information will help you through your recovery at home.    Pain    It is normal for you to experience pain after your surgery, most patients feel their pre-op pain has improved and they are left with a fair amount of surgical pain. This pain will improve a great deal over the 5-7 days you are in the hospital.      It is normal to have some ongoing pain when you get home from the hospital.  The pain should slowly improve over the next several weeks to months.     You should call Dr. Leyva s office if you have a sudden onset of pain that does not improve over 24 hours.     Activity    You may increase your activity as tolerated, walking is the best form of exercise after spine surgery.      Avoid bending, lifting and twisting at the waist (BLT).  Avoid activities such as vacuuming, raking and shoveling.     You should wear your lumbar support when you are up out of bed. You should wear the brace for 6 weeks after your surgery.    Try to limit your lifting to 10lbs until you see Dr. Leyva at your post-op appointment.    You should anticipate being out of work approximately 4-6 weeks after your surgery, some patients are able to return sooner and others need more time.  Dr. Leyva and his staff will help you determine a return to work plan.      You may resume sexual activity 4-6 weeks after surgery.  Stop if you have pain.    Driving    You may drive if you feel strong enough and are not taking any narcotic pain medications.    Incision Site    When you leave the hospital keep your back incision covered for 10 days from your surgery date with daily dressing changes. If you have a stomach incision it can be left uncovered.    You may shower without covering your incision once you are home from the hospital, allow water and soap to run over your incision but do not scrub the area or soak in a tub.      Your incision is covered with steri-strips  (narrow white tapes); they will get loose and fall off in 7-10 days.  If they do not fall off after 10 days you may remove them or Dr. Gordon lake staff will remove for you at your post-op visit.    Most patients have dissolvable stitches which do not need to be removed.  In some cases staples or nylon stitches are used and they need to be removed, 10-14 days after surgery. If you have staples or nylon sutures please call for a removal appointment with Dr. Gordon lake nurse.    Pain Management     Take your prescribed pain medication as needed and directed.  Use Tylenol for your discomfort when you no longer need the narcotic pain medication.      DO NOT take Ibuprofen, Aleve, Motrin, Advil or any other anti-inflammatory medication.  They may affect the bone growth of your fusion.    If you need a refill on your pain medication call [phone], please allow 24 hour for your prescription to be refilled, Dr. Gordon lake office does not refill pain medications on Friday.    Follow-up Visits     You should have a post-op appointment that was scheduled for you at the same time your surgery was scheduled. If you do not please call Dr. Gordon lake office as soon as you get home.  Your appointment will be approximately one month after your surgery.    Write down any questions you have about your surgery, recovery, return to work and other topics you wished to be covered at your post-op visit.  This way, we will be able to address all of your questions at your next visit.    Call your doctor if you have any questions or concerns.    When to Call your Doctor    If you have any redness, warmth or swelling at the incision site.    If your incision opens up.    If you have increasing drainage from your incision.    If you have a temperature of more than 100.5 degrees Fahrenheit.    [address] [place] [id number]  Phone: [phone]  Fax: [phone]  Web site: [email or web address]

## 2018-10-12 NOTE — PLAN OF CARE
Problem: Patient Care Overview  Goal: Plan of Care/Patient Progress Review  Outcome: Improving  A&O X4. CMS intact. Pt irritated at times during nightly assessments stating wants to sleep. Bowel sounds active. VSS. Dressing CDI. Up with AX1, GB, W. Voiding w/out difficulty. Reports back pain decreased w/ prn oxycodone. Discharge plan pending.

## 2018-10-12 NOTE — PROGRESS NOTES
"Meri Progress Note  Chart Reviewed, Pt discussed in Interdisciplinary Rounds.   Pt is expected to be ready medically to discharge today.  Orders are in Epic. TCU has been suggested at discharge since [facility] PCA services will not be available to pt for 2-3 weeks.    Intervention:   MERI spoke with pt and he questions why PCA service takes this long to set up and has agreed to try and call Sona for inquiry.  Pt refused TCU placement.  Per MERI note from 10/11/18, a message has been left with Sona Brewer [facility]  to call pt regarding service and questions.  MERI called again today and left 2 voice messages with Sona to follow up with pt regarding questions regarding services.  MERI spoke with pt regarding discharge plan and pt agreed to try and speak with Sona and also to contact a friend who could assist him at home until PCA services start or he no longer needs assistance.    Addendum: SW received message from Sona Brewer regarding pt's services. She may be able to expedite services with utilizing a PCA service that pt's friend works for and he has used in the past for PCA services. Name of the agency is [facility]. Sona updated with pt's dishcharge plan for today. Pt's plans on having his daughter pick him up at approximately 18:00 tonight. Pt's daughter, Rose and his \"lady friend\" will be providing pt with assistance over the next several days.    Team Members notified:   OTTONIELRN    Plan: Discharge today  Anticipated discharge placement:  home  Barriers: assistance at home  Follow up plan: consider home care and have pt locate friend who could assist pt at home temporarily. Pt has enlisted the assistance of his daughter, Rose, and friend to assist him at home over the weekend. Pt hoping to be able to access PCA sooner than expected previously with the use of his friend's agency. She has provides services to pt previously. Pt offered home care assistance and he declined stating that he has a \"Sholom\" worker, " and ILS worker and PCA to provide support.    14:30: Pt's [facility] Coordinator, Nevaeh Richmond( [phone]) called and inquired whether pt was going to have home care and SW updated her.  She requested to speak to pt and SW sent call into pt room. Nevaeh Richmond returned call and stated that pt is not agreeable to having home care RN into the home once she recommended he do so. Referral made to [facility].   Carolee states that she will be out to see pt on Monday and asked that her information be given to [facility] for follow up.  Referral done and information for [facility] included.  15:02 Physician not willing to order home care in that he feels pt does not need PT or RN at discharge. Referral cancelled and  Carolee Richmond updated along with pt and [facility].    NNEKA Mojica  [facility]  Phone: [phone]

## 2018-10-12 NOTE — PLAN OF CARE
Alert and oriented x4. VSS. Paincontroled with oxycodone. Up SBA. Dressing changed. CMS intact. Prescriptions sent to discharge pharmacy. Plan to discharge today at 1800. Will continue to monitor.

## 2018-10-12 NOTE — PLAN OF CARE
Problem: Patient Care Overview  Goal: Plan of Care/Patient Progress Review  Discharge Planner PT   Patient plan for discharge: home with PCA assist for household tasks  Current status: Patient apparent with frustration this morning. Stated he just walked with nursing in hallway. Agreeable to therapy after encouragement and education. Completed supine to sit with SBA; needing cues to use log roll technique. Sit to stand transfer to FWW completed with SBA; safety cues for hand placement. Ambulated 400 ft x 1 with FWW and SBA; cues for upright posture. Went up/down 3 stairs x 2 with SBA; cues required for sequencing, technique and to maintain post op precautions. Up in chair at end of session.    Barriers to return to prior living situation: pain, level of assist required, limited mobility  Recommendations for discharge: Patient would be safe to return home to apartment if assist is available for household tasks and ADLs. If not, patient may need TCU to progress independent mobility to safely negotiate residence and perform ADLs after discussion with PT.  Rationale for recommendations: Patient is addiment about returning to apartment with PCA services. States he should have already been approved for these services. Per social work note, may take several weeks to obtain. Patient could return home to apartment if these services can be provided. If not, patient could benefit from TCU to improve strength, endurance and safety with mobility.         Entered by: Paula Craft 10/12/2018 9:21 AM       Patient discharged to home. PT goals partially met.

## 2018-10-12 NOTE — PLAN OF CARE
Problem: Patient Care Overview  Goal: Plan of Care/Patient Progress Review  Occupational Therapy Discharge Summary    Reason for therapy discharge:    Home with assist from daughter and friend per chart     Progress towards therapy goal(s). See goals on Care Plan in [facility] health record for goal details.  Goals partially met.  Barriers to achieving goals:   discharge from facility.    Therapy recommendation(s):    Pt would be safe to return home if he has assist available. If not, may need TCU stay to improve independence with ADL's and transfers Per chart. Pt did discharge to home with assist from daughter and friend, per chart.       -Therapist signing discharge did not see patient, information obtained from chart review.

## 2018-11-01 NOTE — DISCHARGE SUMMARY
[facility]    Discharge Summary  Spine Surgery    Date of Admission:  10/8/2018  Date of Discharge:  10/12/2018  6:30 PM  Discharging Provider: Hernesto Leyva  Date of Service (when I saw the patient): 11/01/18    Primary Diagnosis This Admission:  1.  Degenerative spondylolisthesis of L5 on S1 with recurrent synovial cyst and right S1 nerve root compression, status post previous L5-S1 synovial cyst excision.   2.  Progressive instability with a new S1 weakness.       Additional Diagnosis This Admission:    Principal Procedure This Admission:  PROCEDURES:   1.  Posterior spinal fusion, L5-S1, with morcellized autologous bone graft.   2.  Bilateral L5-S1 revision decompression and excision of synovial cysts bilaterally   3.  Posterior nonsegmental instrumentation with [employer] 3D instrumentation.   4.  [place] of right iliac crest bone graft.   Additional Procedures:    Hospital Course:  The patient underwent the above-noted procedure on the day of admission.  There were no intraoperative complications.  He had a low amount of blood loss only 100 mL's.  We did find that he had a fairly large synovial cyst associated with the facet joint on the right side.    On postoperative day #1 he was having some problems with pain control.  On postoperative day #2 his PCA was discontinued.  He was started on p.o. pain meds.  He did have some trouble with constipation on postoperative day #3 and feeling bloated.  We were able to get him on a regular diet eventually.  He wanted to be careful with his use of pain medications because of his history of drug abuse.  He was cut about using those.    On postoperative day #4 he was eating a regular diet, voiding, using p.o. pain meds.  His incision was clean and dry and he was ready for discharge.    He has a discharge instruction sheet.  He has a postop visit in 3-4 weeks.  He was given a prescription for oxycodone.    Pain Medication on Discharge: Oxycodone on  discharge.     Antibiotics prescribed at discharge: None prescribed     Hernesto Leyva    Discharge Disposition   Discharged to home   Condition at discharge: Stable    Primary Care Physician   Yusuf Dey    Consultations This Hospital Stay   ORTHOSIS SPINAL IP CONSULT  OCCUPATIONAL THERAPY ADULT IP CONSULT  PHYSICAL THERAPY ADULT IP CONSULT  SOCIAL WORK IP CONSULT    Time Spent on this Encounter   I have spent less than 30 minutes on this discharge.    Discharge Orders     Follow-up and recommended labs and tests    Give discharge instruction sheet and dressing supplies.  Patient has a follow-up appointment to see me in three weeks.  Please call Dr Leyva's clinic at [phone] if you have questions.       Discharge Medications   Discharge Medication List as of 10/12/2018  6:03 PM      START taking these medications    Details   order for DME Equipment being ordered: Walker Wheels () and Walker ()  Treatment Diagnosis: difficulty ambulatingDisp-1 each, R-0, Local Print      !! oxyCODONE IR (ROXICODONE) 5 MG tablet Take 1-2 tablets (5-10 mg) by mouth every 3 hours as needed, Disp-50 tablet, R-0, Local Print       !! - Potential duplicate medications found. Please discuss with provider.      CONTINUE these medications which have NOT CHANGED    Details   fluticasone (FLONASE) 50 MCG/ACT nasal spray Spray 2 sprays into both nostrils daily as needed , Historical      loratadine (CLARITIN) 10 MG tablet Take 10 mg by mouth daily, Historical      Multiple Vitamins-Minerals (CENTRUM SILVER) per tablet Take 1 tablet by mouth daily, Historical      nicotine polacrilex (NICORETTE) 4 MG gum Place 4 mg inside cheek daily as needed for smoking cessation, Historical      !! OXYCODONE HCL PO Take 5 mg by mouth every 12 hours as needed (Pain), Historical      triamcinolone (KENALOG) 0.1 % cream Apply topically 2 times daily as needed for irritationHistorical       !! - Potential duplicate medications found.  Please discuss with provider.      STOP taking these medications       Celecoxib (CELEBREX PO) Comments:   Reason for Stopping:             Allergies   Allergies   Allergen Reactions     Chantix [Varenicline]      Gadolinium Derivatives      Contrast media     Nicotine      Nicotine patch causes itching.     Penicillins      Data   Most Recent 3 CBC's:  Recent Labs   Lab Test  10/11/18   0858  10/10/18   0830  10/09/18   0755  10/27/17   1310   WBC   --    --    --   5.0   HGB  11.0*  10.5*  11.6*  13.5   MCV   --    --    --   95   PLT   --    --    --   222      Most Recent 3 BMP's:  Recent Labs   Lab Test  10/27/17   1310  01/04/16   0825   NA  138   --    POTASSIUM  3.9  3.8   CHLORIDE  105   --    CO2  27   --    BUN  12   --    CR  0.80   --    ANIONGAP  6   --    BROCK  9.4   --    GLC  91  93     Most Recent 2 LFT's:No lab results found.  Most Recent INR's and Anticoagulation Dosing History:  Anticoagulation Dose History     There is no flowsheet data to display.        Most Recent 3 Troponin's:No lab results found.  Most Recent Cholesterol Panel:No lab results found.  Most Recent 6 Bacteria Isolates From Any Culture (See EPIC Reports for Culture Details):No lab results found.  Most Recent TSH, T4 and A1c Labs:No lab results found.

## 2019-01-01 ENCOUNTER — COMMUNICATION - HEALTHEAST (OUTPATIENT)
Dept: INTERNAL MEDICINE | Facility: CLINIC | Age: 64
End: 2019-01-01

## 2019-01-01 ENCOUNTER — AMBULATORY - HEALTHEAST (OUTPATIENT)
Dept: INTERNAL MEDICINE | Facility: CLINIC | Age: 64
End: 2019-01-01

## 2019-01-01 ENCOUNTER — RECORDS - HEALTHEAST (OUTPATIENT)
Dept: ADMINISTRATIVE | Facility: OTHER | Age: 64
End: 2019-01-01

## 2019-01-01 ENCOUNTER — OFFICE VISIT - HEALTHEAST (OUTPATIENT)
Dept: OTOLARYNGOLOGY | Facility: CLINIC | Age: 64
End: 2019-01-01

## 2019-01-01 ENCOUNTER — OFFICE VISIT - HEALTHEAST (OUTPATIENT)
Dept: INTERNAL MEDICINE | Facility: CLINIC | Age: 64
End: 2019-01-01

## 2019-01-01 ENCOUNTER — AMBULATORY - HEALTHEAST (OUTPATIENT)
Dept: OTHER | Facility: CLINIC | Age: 64
End: 2019-01-01

## 2019-01-01 DIAGNOSIS — B18.2 CHRONIC HEPATITIS C WITHOUT HEPATIC COMA (H): ICD-10-CM

## 2019-01-01 DIAGNOSIS — F32.0 MILD MAJOR DEPRESSION (H): ICD-10-CM

## 2019-01-01 DIAGNOSIS — R97.20 ELEVATED PSA: ICD-10-CM

## 2019-01-01 DIAGNOSIS — M54.16 RIGHT LUMBAR RADICULOPATHY: ICD-10-CM

## 2019-01-01 DIAGNOSIS — E11.9 TYPE 2 DIABETES MELLITUS WITHOUT COMPLICATION, WITHOUT LONG-TERM CURRENT USE OF INSULIN (H): ICD-10-CM

## 2019-01-01 DIAGNOSIS — Z00.00 ROUTINE GENERAL MEDICAL EXAMINATION AT A HEALTH CARE FACILITY: ICD-10-CM

## 2019-01-01 DIAGNOSIS — L72.3 INFECTED SEBACEOUS CYST: ICD-10-CM

## 2019-01-01 DIAGNOSIS — N52.9 ERECTILE DYSFUNCTION, UNSPECIFIED ERECTILE DYSFUNCTION TYPE: ICD-10-CM

## 2019-01-01 DIAGNOSIS — Z72.0 TOBACCO ABUSE: ICD-10-CM

## 2019-01-01 DIAGNOSIS — S81.802D NON-HEALING WOUND OF LOWER EXTREMITY, LEFT, SUBSEQUENT ENCOUNTER: ICD-10-CM

## 2019-01-01 DIAGNOSIS — L30.9 DERMATITIS: ICD-10-CM

## 2019-01-01 DIAGNOSIS — Z12.5 SCREENING FOR PROSTATE CANCER: ICD-10-CM

## 2019-01-01 DIAGNOSIS — S81.802A NON-HEALING WOUND OF LOWER EXTREMITY, LEFT, INITIAL ENCOUNTER: ICD-10-CM

## 2019-01-01 DIAGNOSIS — R68.84 JAW PAIN: ICD-10-CM

## 2019-01-01 DIAGNOSIS — R13.19 ESOPHAGEAL DYSPHAGIA: ICD-10-CM

## 2019-01-01 DIAGNOSIS — M17.0 PRIMARY OSTEOARTHRITIS OF BOTH KNEES: ICD-10-CM

## 2019-01-01 DIAGNOSIS — R20.2 PARESTHESIA OF RIGHT FOOT: ICD-10-CM

## 2019-01-01 DIAGNOSIS — J30.89 NON-SEASONAL ALLERGIC RHINITIS, UNSPECIFIED TRIGGER: ICD-10-CM

## 2019-01-01 DIAGNOSIS — J30.2 SEASONAL ALLERGIES: ICD-10-CM

## 2019-01-01 DIAGNOSIS — H60.392 INFECTION OF EAR LOBE, LEFT: ICD-10-CM

## 2019-01-01 DIAGNOSIS — Z13.220 ENCOUNTER FOR SCREENING FOR LIPOID DISORDERS: ICD-10-CM

## 2019-01-01 DIAGNOSIS — L08.9 INFECTED SEBACEOUS CYST: ICD-10-CM

## 2019-01-01 LAB
ALBUMIN SERPL-MCNC: 4.5 G/DL (ref 3.5–5)
ALP SERPL-CCNC: 84 U/L (ref 45–120)
ALT SERPL W P-5'-P-CCNC: 24 U/L (ref 0–45)
ANION GAP SERPL CALCULATED.3IONS-SCNC: 13 MMOL/L (ref 5–18)
AST SERPL W P-5'-P-CCNC: 26 U/L (ref 0–40)
BILIRUB SERPL-MCNC: 0.4 MG/DL (ref 0–1)
BUN SERPL-MCNC: 11 MG/DL (ref 8–22)
CALCIUM SERPL-MCNC: 9.5 MG/DL (ref 8.5–10.5)
CHLORIDE BLD-SCNC: 105 MMOL/L (ref 98–107)
CHOLEST SERPL-MCNC: 228 MG/DL
CO2 SERPL-SCNC: 20 MMOL/L (ref 22–31)
CREAT SERPL-MCNC: 0.71 MG/DL (ref 0.7–1.3)
CREAT UR-MCNC: 126.5 MG/DL
ERYTHROCYTE [DISTWIDTH] IN BLOOD BY AUTOMATED COUNT: 13.2 % (ref 11–14.5)
FASTING STATUS PATIENT QL REPORTED: YES
GFR SERPL CREATININE-BSD FRML MDRD: >60 ML/MIN/1.73M2
GLUCOSE BLD-MCNC: 93 MG/DL (ref 70–125)
HBA1C MFR BLD: 5.4 % (ref 3.5–6)
HBA1C MFR BLD: 5.9 % (ref 3.5–6)
HCT VFR BLD AUTO: 39.3 % (ref 40–54)
HDLC SERPL-MCNC: 73 MG/DL
HGB BLD-MCNC: 13.3 G/DL (ref 14–18)
LDLC SERPL CALC-MCNC: 137 MG/DL
MCH RBC QN AUTO: 32.4 PG (ref 27–34)
MCHC RBC AUTO-ENTMCNC: 33.8 G/DL (ref 32–36)
MCV RBC AUTO: 96 FL (ref 80–100)
MICROALBUMIN UR-MCNC: 0.88 MG/DL (ref 0–1.99)
MICROALBUMIN/CREAT UR: 7 MG/G
PLATELET # BLD AUTO: 213 THOU/UL (ref 140–440)
PMV BLD AUTO: 9.6 FL (ref 8.5–12.5)
POTASSIUM BLD-SCNC: 3.8 MMOL/L (ref 3.5–5)
PROT SERPL-MCNC: 8.2 G/DL (ref 6–8)
PSA SERPL-MCNC: 5.9 NG/ML (ref 0–4.5)
PSA SERPL-MCNC: 7.4 NG/ML (ref 0–4.5)
RBC # BLD AUTO: 4.1 MILL/UL (ref 4.4–6.2)
SODIUM SERPL-SCNC: 138 MMOL/L (ref 136–145)
TRIGL SERPL-MCNC: 89 MG/DL
WBC: 3.6 THOU/UL (ref 4–11)

## 2019-01-01 ASSESSMENT — MIFFLIN-ST. JEOR
SCORE: 1479.79
SCORE: 1470.72
SCORE: 1484.33
SCORE: 1475.25

## 2021-05-27 NOTE — TELEPHONE ENCOUNTER
Fax received from [facility] pharmacy requesting Prior Authorization    Medication Name: Omeprazole 20mg capsule    Insurance Plan: [employer]   PBM: [facility]   Patient ID Number: not provided on fax    Please start PA process

## 2021-05-27 NOTE — TELEPHONE ENCOUNTER
Central PA team  [phone]  Pool: HE PA MED ([id number])          PA has been initiated.       PA form completed and faxed insurance via Cover My Meds     Key:  [id number] - PA Case ID: [id number]     Medication:  Omeprazole 20MG OR CPDR    Insurance:  [facility]        Response will be received via fax and may take up to 5-10 business days depending on plan

## 2021-05-27 NOTE — PROGRESS NOTES
HPI: This patient is a 62 yo who presents to clinic for evaluation of the ear at the request of Dr. Dey. Patient was seen on 3/21/19 and ntoed to have an infected boil/cyst of the left ear lobe.  Oral antibiotics were started and patient has noted decrease in swelling, but still states pain.  He has had this come and go for about 2 years in the same spot. It has never drained.  Denies otorrhea, hearing changes, vertigo, or other ENT symptoms.  Patient with Hep C and DMII.    Past medical history, surgical history, social history, family history, medications, and allergies have been reviewed with the patient and are documented above.    Review of Systems: a 10-system review was performed. Pertinent positives are noted in the HPI and on a separate scanned document in the chart.    PHYSICAL EXAMINATION:  GEN: no acute distress, normocephalic  EYES: extraocular movements are intact, pupils are equal and round. Sclera clear.   EARS: auricles are normally formed. Bilateral EACs with minimal to no cerumen. Tympanic membranes are intact bilaterally with no signs of infection, effusion, retractions, or perforations.  1 cm raised, erythematous, tender mass resembling an abscess posterior to the superior helix of left ear.  NOSE: anterior nares are patent. There are no masses or lesions. The septum is non-obstructing.  OC/OP: clear, dentition is in good repair. The tongue and palate are fully mobile and symmetric.   NECK: soft and supple. No lymphadenopathy or masses. Airway is midline.  NEURO: CN VII and XII are intact and symmetric. alert and interactive. No nystagmus.   PULM: breathing comfortably on room air, normal chest expansion with respiration  HEART: no clubbing or cyanosis, no peripheral edema    PROCEDURE: Area of concern was anesthestized with 1% lidocaine with 1:100k epinephrine, then a horizontal incision was made with an 11 blade.  Pus was expressed from abscess. Patient tolerated procedure well.   Incision covered with gauze and tape.    MEDICAL DECISION-MAKING: Patient is a 64 y/o male with infected sebaceous cyst.  I&D was performed in office today without difficulty.  Will treat with antibiotics for another week. He may return to clinic prn.

## 2021-05-30 ENCOUNTER — RECORDS - HEALTHEAST (OUTPATIENT)
Dept: ADMINISTRATIVE | Facility: CLINIC | Age: 66
End: 2021-05-30

## 2021-05-30 VITALS — HEIGHT: 70 IN | WEIGHT: 149 LBS | BODY MASS INDEX: 21.33 KG/M2

## 2021-05-30 VITALS — HEIGHT: 70 IN | WEIGHT: 152 LBS | BODY MASS INDEX: 21.76 KG/M2

## 2021-05-30 VITALS — HEIGHT: 70 IN | BODY MASS INDEX: 21.62 KG/M2 | WEIGHT: 151 LBS
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
## 2021-05-30 NOTE — TELEPHONE ENCOUNTER
Reason contacted:  results  Information relayed:  Relayed message below to patient  Additional questions:  No  Further follow-up needed:  No  Okay to leave a detailed message:  No

## 2021-05-30 NOTE — TELEPHONE ENCOUNTER
Medication Question or Clarification  Who is calling: [facility]  What medication are you calling about? (include dose and sig)   sildenafil (VIAGRA) 100 MG tablet 6 tablet 1 7/18/2019     Sig - Route: Take 1 tablet (100 mg total) by mouth daily as needed for erectile dysfunction. - Oral        Who prescribed the medication?: Dr Dey  What is your question/concern?: Fax request received from pharmacy states: Patient is requesting a dose change from sildenafil 100 mg to sildenafil 20 mg. The lower dose will be a much better price for the patient, thank you.  Pharmacy: [facility]  Okay to leave a detailed message?: Yes  Site CMT - Please call the pharmacy to obtain any additional needed information.

## 2021-05-30 NOTE — TELEPHONE ENCOUNTER
Spoke with the patient and relayed message below from Dr. Dey.  Patient verbalized understanding and had no further questions at this time.  Sydnie WATSON CMA/ERIC....................3:23 PM

## 2021-05-31 ENCOUNTER — RECORDS - HEALTHEAST (OUTPATIENT)
Dept: ADMINISTRATIVE | Facility: CLINIC | Age: 66
End: 2021-05-31

## 2021-05-31 VITALS — WEIGHT: 157 LBS | HEIGHT: 70 IN | BODY MASS INDEX: 22.48 KG/M2

## 2021-05-31 VITALS — WEIGHT: 148 LBS | BODY MASS INDEX: 21.19 KG/M2 | HEIGHT: 70 IN

## 2021-05-31 NOTE — TELEPHONE ENCOUNTER
Fax received from [facility] pharmacy requesting Prior Authorization    Medication Name:   sildenafil (REVATIO) 20 mg tablet 30 tablet 11 9/3/2019     Sig - Route: Take 3-5 tablets ( mg total) by mouth daily as needed (erectile dysfunction). - Oral    Sent to pharmacy as: sildenafil (REVATIO) 20 mg tablet    E-Prescribing Status: Receipt confirmed by pharmacy (9/3/2019  1:12 PM CDT)          Insurance Plan: [employer]   PBM:    Patient ID Number: [id number]    Please start PA process
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
## 2021-06-01 ENCOUNTER — RECORDS - HEALTHEAST (OUTPATIENT)
Dept: ADMINISTRATIVE | Facility: CLINIC | Age: 66
End: 2021-06-01

## 2021-06-01 VITALS — HEIGHT: 70 IN | BODY MASS INDEX: 21.62 KG/M2 | WEIGHT: 151 LBS

## 2021-06-01 VITALS — HEIGHT: 70 IN | WEIGHT: 151 LBS | BODY MASS INDEX: 21.62 KG/M2

## 2021-06-01 NOTE — TELEPHONE ENCOUNTER
[facility] PA team  [phone]  Pool: [facility] PA MED ([id number])          PA has been initiated.       PA form completed and faxed insurance via Cover My Meds     Key:  [id number]       Medication:  Sildenafil Citrate 20MG tablets      Insurance: [employer]           Response will be received via fax and may take up to 5-10 business days depending on plan

## 2021-06-01 NOTE — TELEPHONE ENCOUNTER
Spoke with the patient and relayed message below from Dr. Dey.  He verbalized understanding and states that he will contact his insurance company to see what will be covered and let us know if he needs a new prescription.  Patient had no further questions at this time.  Sydnie WTASON CMA/ERIC....................9:01 AM

## 2021-06-02 VITALS — BODY MASS INDEX: 22.05 KG/M2 | WEIGHT: 154 LBS | HEIGHT: 70 IN

## 2021-06-02 VITALS — HEIGHT: 70 IN | BODY MASS INDEX: 22.19 KG/M2 | WEIGHT: 155 LBS

## 2021-06-02 VITALS — BODY MASS INDEX: 22.05 KG/M2 | HEIGHT: 70 IN | WEIGHT: 154 LBS

## 2021-06-02 NOTE — TELEPHONE ENCOUNTER
New Appointment Needed  What is the reason for the visit:    Pre-Op Appt Request  When is the surgery? :  11/05/19  Where is the surgery?:   [facility]   Who is the surgeon? :  [facility]   What type of surgery is being done?: Biopsy  Provider Preference: PCP only  How soon do you need to be seen?: next week  Waitlist offered?: No  Okay to leave a detailed message:  Yes

## 2021-06-02 NOTE — TELEPHONE ENCOUNTER
Spoke with the patient and relayed message below from Dr. Dey.  Patient verbalized understanding and had no further questions at this time.  He has gretel scheduled as requested by Dr. Dey.  Sydnie WATSON CMA/ERIC....................1:46 PM

## 2021-06-02 NOTE — TELEPHONE ENCOUNTER
Dr. Dey,  Is there anywhere you would like to fit this patient on your schedule for next week, or should we try to schedule with another provider?  Please advise.  Thank you.  Sydnie WATSON CMA/ERIC....................2:47 PM

## 2021-06-03 VITALS
HEIGHT: 70 IN | OXYGEN SATURATION: 98 % | SYSTOLIC BLOOD PRESSURE: 138 MMHG | DIASTOLIC BLOOD PRESSURE: 74 MMHG | HEART RATE: 85 BPM | BODY MASS INDEX: 21.9 KG/M2 | WEIGHT: 153 LBS

## 2021-06-03 VITALS — BODY MASS INDEX: 21.76 KG/M2 | HEIGHT: 70 IN | WEIGHT: 152 LBS
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
## 2021-06-09 NOTE — PROGRESS NOTES
[facility] Certification Request    April 5, 2017      Patient: Olu Osuna  MR Number: [id number]  YOB: 1955  Date of Visit: 4/5/2017      Dear DONTE Ashton:    Thank you for this referral.   We are seeing Olu Osuna for Physical Therapy of chronic low back with leg symptoms.    Medicare and/or Medicaid requires physician review and approval of the treatment plan. Please review the plan of care and verify that you agree with the therapy plan of care by co-signing this note.      Plan of Care  Authorization / Certification Start Date: 04/05/17  Authorization / Certification End Date: 07/05/17  Authorization / Certification Number of Visits: 12  Communication with: Referral Source  Patient Related Instruction: Nature of Condition;Treatment plan and rationale;Self Care instruction;Basis of treatment;Body mechanics;Posture;Precautions;Next steps;Expected outcome  Times per Week: 1  Number of Weeks: 12  Number of Visits: 12  Discharge Planning: prn  Precautions / Restrictions : none  Therapeutic Exercise: ROM;Stretching;Strengthening  Neuromuscular Reeducation: kinesio tape;posture;balance/proprioception;TNE;postural restoration;core  Manual Therapy: soft tissue mobilization;myofascial release;joint mobilization;muscle energy;strain counterstrain;craniosacral therapy;visceral manipulation  Modalities: traction;TENS;ultrasound  Functional Training (ADL's): ADL's;ergonomics  Gait Training: prn  Equipment: theraband    Goals:  Pt. will be independent with home exercise program in : 12 weeks  Pt. will have improved quality of sleep: waking less times/night;getting 75-90% of required amount;in 12 weeks  Pt. will be able to walk : 30 minutes;1 mile;in 12 weeks  Patient will stand : 30 minutes;in 12 weeks;with less pain;with less difficultty  Patient will sit: 30 minutes;for driving;for watching TV;in 12 weeks  No Data Recorded      If you have any questions or concerns, please  don't hesitate to call.    Sincerely,      Jie Chacon, PT        Physician recommendation:     ___ Follow therapist's recommendation        ___ Modify therapy      *Physician co-signature indicates they certify the need for these services furnished within this plan and while under their care.      Chronic low back pain with leg symptoms  Diagnosis: Right leg pain (M79.604)   [facility]   [facility] Initial Evaluation    Patient Name: Olu Osuna  Date of evaluation: 4/5/2017  Referral Diagnosis: Right leg pain  Referring provider: Yusuf Dey MD  Visit Diagnosis:     ICD-10-CM    1. Right leg pain M79.604    2. Lumbar radiculopathy M54.16        Assessment:    The patient had tightness in the anterior stomach on the left side.  The patient had an up slip on the left that was corrected after the STM, MFR to the left and right lateral lower stomach.  The FRS left L5 was corrected after wall [place] and shifting the hips to the left and dropping the hips, but the patient continues to have increased paraspinal tone on the left paraspinals but we were out of time today so unable to continue with this.  Pt. is appropriate for skilled PT intervention as outlined in the Plan of Care (POC).  Pt. is a good candidate for skilled PT services to improve pain levels and function.    Goals:  Pt. will be independent with home exercise program in : 12 weeks  Pt. will have improved quality of sleep: waking less times/night;getting 75-90% of required amount;in 12 weeks  Pt. will be able to walk : 30 minutes;1 mile;in 12 weeks  Patient will stand : 30 minutes;in 12 weeks;with less pain;with less difficultty  Patient will sit: 30 minutes;for driving;for watching TV;in 12 weeks      Patient's expectations/goals are realistic.    Barriers to Learning or Achieving Goals:  No Barriers.       Plan / Patient Instructions:        Plan of Care:   Authorization / Certification Start Date:  17  Authorization / Certification End Date: 17  Authorization / Certification Number of Visits: 12  Communication with: Referral Source  Patient Related Instruction: Nature of Condition;Treatment plan and rationale;Self Care instruction;Basis of treatment;Body mechanics;Posture;Precautions;Next steps;Expected outcome  Times per Week: 1  Number of Weeks: 12  Number of Visits: 12  Discharge Planning: prn  Precautions / Restrictions : none  Therapeutic Exercise: ROM;Stretching;Strengthening  Neuromuscular Reeducation: kinesio tape;posture;balance/proprioception;TNE;postural restoration;core  Manual Therapy: soft tissue mobilization;myofascial release;joint mobilization;muscle energy;strain counterstrain;craniosacral therapy;visceral manipulation  Modalities: traction;TENS;ultrasound  Functional Training (ADL's): ADL's;ergonomics  Gait Training: prn  Equipment: theraband    Plan for next visit: Assess the patient's SI and low back position, do listening technique, add in pelvic clock, assess the patient's hip ROM and the rest of the strength.  Add abdominal and back strengthening.  Do manual therapy as needed.  Assess joint mobility of the lumbar spine.  Assess palpation and see if her has any numbness or tingling.       Subjective:     retired    History of Present Illness:    Olu is a 61 y.o. male who presents to therapy today with complaints of right leg pain laterall and anteriorly but mostly laterally from 2/3rds the way down the thigh to illiac crest  And posteriorly to the paraspinals on the back. . Date of onset/duration of symptoms is . Onset was gradual. Symptoms are constant.      Pain Ratin  Pain rating at best: 3  Pain rating at worst: 9  Pain description: aching, numbness, sharp, shooting and pulling    Functional limitations are described as occurring with:   ascending stairs or curbs  bending  sitting pain is constant pain is right away constantly moving  sleeping  standing 10-15  minutes  transitional movements getting out of  chair and car and sit to stand  walking .8 mile 15 minutes    Patient reports unknown what helps has just started using a hot pack and aleve         Objective:      Note: Items left blank indicates the item was not performed or not indicated at the time of the evaluation.    Patient Outcome Measures :    Modified Oswestry Low Back Pain Disablity Questionnaire  in %: 66   Scores range from 0-100%, where a score of 0% represents minimal pain and maximal function. The minimal clinically important difference is a score reduction of 12%.      Examination  1. Right leg pain     2. Lumbar radiculopathy       Involved side: Right  Posture Observation:      General sitting posture is  poor.  General standing posture is poor.    Lumbar ROM:    Date:      *Indicate scale AROM AROM AROM   Lumbar Flexion To ankles     Lumbar Extension Major movement loss no change in pain      Right Left Right Left Right Left   Lumbar Sidebending To knee increases when bend to the right To knee       Lumbar Rotation         Thoracic Flexion      Thoracic Extension      Thoracic Sidebending         Thoracic Rotation           Lower Extremity Strength:     Date:      LE strength/5 Right Left Right Left Right Left   Hip Flexion (L1-3) 5/5 5/5       Hip Extension (L5-S1)         Hip Abduction (L4-5)         Hip Adduction (L2-3)         Hip External Rotation 5-/5 5/5       Hip Internal Rotation 5/5 pain lateral leg 5/5       Knee Extension (L3-4) 5/5 5/5       Knee Flexion 5/5 5/5       Ankle Dorsiflexion (L4-5) 5/5 5/5       Great Toe Extension (L5) 5/5 5/5       Ankle Plantar flexion (S1)         Abdominals        Sensation           Reflex Testing  Lumbar Dermatomes Right Left UE Reflexes Right Left   Iliac Crest and Groin (L1)   Biceps (C5-6)     Anterior Medial Thigh (L2)   Brachioradialis (C5-6)     Anterior Thigh, Medial Epicondyle Femur (L3)   Triceps (C7-8)     Lateral Thigh, Anterior Knee,  Medial Leg/Malleolus (L4)   Cole s test     Lateral Leg, Dorsal Foot (L5)   LE Reflexes     Lateral Foot (S1)   Patellar (L3-4)     Posterior Leg (S2)   Achilles (S1-2)     Other:   Babinski Response       Palpation:Tender to the touch right buttock the most where the sciatic nerve goes down tender to the touch also along the sacrum ad  Laterally to the lateral hip on the right side   Left high superior shear   Tender with IR on the right  Lumbar Special Tests:     Lumbar Special Tests Right Left SI Tests Right  Left   Quadrant test   SI Compression     Straight leg raise 40 pain anterior lateral leg 70 pulling pain posterior leg on the left  SI Distraction     Crossover response   POSH Test     Slump   Sacral Thrust     Sit-up test  FADIR     Trunk extensor endurance test  Resisted Abduction     Prone instability test  Other:     Pubic shotgun  Other:         Passive Mobility - Joint Integrity:  Not tested    LE Screen:  tender with IR on the right    Treatment Today     TREATMENT MINUTES COMMENTS   Evaluation 45    Self-care/ Home management     Manual therapy     Neuromuscular Re-education     Therapeutic Activity     Therapeutic Exercises 10 See exercises   Gait training     Modality__________________                Total 55    Blank areas are intentional and mean the treatment did not include these items.     PT Evaluation Code: (Please list factors)  Patient History/Comorbidities: paresthesias  Examination: Si and low back dysfunction also leg pain  Clinical Presentation: stable  Clinical Decision Making: low    Patient History/  Comorbidities Examination  (body structures and functions, activity limitations, and/or participation restrictions) Clinical Presentation Clinical Decision Making (Complexity)   No documented Comorbidities or personal factors 1-2 Elements Stable and/or uncomplicated Low   1-2 documented comorbidities or personal factor 3 Elements Evolving clinical presentation with changing  characteristics Moderate   3-4 documented comorbidities or personal factors 4 or more Unstable and unpredictable High                Jie Chacon PT  4/5/2017  10:39 AM
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
## 2021-06-10 NOTE — PROGRESS NOTES
[facility] Daily Progress     Patient Name: Olu Osuna  Date: 2017  Visit #: 2  PTA visit #:  0  Referral Diagnosis: lumbar radiculopathy  Referring provider: Yusuf Dey MD  Visit Diagnosis:     ICD-10-CM    1. Right leg pain M79.604    2. Lumbar radiculopathy M54.16          Assessment:   The patient is left rotated throughout the lumbar spine and has a tight arcuate ligament on the right.  Did arcuate ligament treatment on the right the patient reported initially pain dropped 3 points but then his pain returned after treatment the patient's spine looked neutral.  He continues to have pain.  When seated his pain is 6/10 pain but he has difficulty with putting weight through his leg when standing and walking.  The patient reports having to move all of the time to be comfortable even when lying down and occasionally will get pain all the way down his leg.    HEP/POC compliance is  fair .    Goal Status:  Pt. will be independent with home exercise program in : 12 weeks  Pt. will have improved quality of sleep: waking less times/night;getting 75-90% of required amount;in 12 weeks  Pt. will be able to walk : 30 minutes;1 mile;in 12 weeks  Patient will stand : 30 minutes;in 12 weeks;with less pain;with less difficultty  Patient will sit: 30 minutes;for driving;for watching TV;in 12 weeks      Plan / Patient Education:     Continue with initial plan of care.  Progress with home program as tolerated.     Assess response to prone over pillows he was unable to lay prone today due to pain.  Assess if he has a + crossover SLR.  Assess spine position and assess response to previous treatment.  Try to add in some abdominal isometrics and see if we can go over proper posture.      Subjective:     Pain Ratin  Sometimes he will have pain that will go down to his feet when he is laying down.  He has to keep moving throughout the night due to pain.    No worse after exercise.  Hurts during the  exercises.      Objective:     L5 left rotated  No change with shifting and dropping hips.   The patient is left rotated throughout the lumbar spine and has a tight arcuate ligament on the right.  Did arcuate ligament treatment on the right the patient reported initially pain dropped 3 points but then his pain returned after treatment the patient's spine looked neutral.  He continues to have pain.  When seated his pain is 6/10 pain but he has difficulty with putting weight through his leg when standing and walking.    Treatment Today     TREATMENT MINUTES COMMENTS   Evaluation     Self-care/ Home management     Manual therapy 7 Arcuate ligament treatment   Neuromuscular Re-education 8 kinesio tape I tape to bilateral low back and lower thoracic spine with a stabilizing I tape at L5 and T12,L1 region.   Therapeutic Activity     Therapeutic Exercises 15 trialed prone lying but unable to tolerate today he is to try again in a few days and try with 1-3 pillows and assess.  No significant change with wall circles today   Gait training     Modality__________________                Total 30    Blank areas are intentional and mean the treatment did not include these items.       Jie Chacon PT  4/25/2017

## 2021-06-10 NOTE — PROGRESS NOTES
[facility] Daily Progress     Patient Name: Olu Osuna  Date: 5/16/2017  Visit #: 4  PTA visit #:  0  Referral Diagnosis: lumbar radiculopathy  Referring provider: Yusuf Dey MD  Visit Diagnosis:     ICD-10-CM    1. Right leg pain M79.604    2. Lumbar radiculopathy M54.16    3. Acute right lumbar radiculopathy M54.16          Assessment:   Trialed Edith approach with and without pillows with and without a shift prone and also standing and unable to get a decrease in pain with extension consistently.  When doing NISSA I was able to get a temporary decrease in his pain to a 2/10 and just in the central low back and hip no symptoms in the thigh.  The patient was able to get the lumbar spine to straighten out after doing a quadriped latissimus stretch and just stretching his arm overhead.  The patient was chrystal to advance to adherent nerve root stretch, slump slider caused increased symptoms, he was lian to progress to standing abdominal and gluteal isometrics and supine stabilization.           HEP/POC compliance is  fair .    Goal Status:  Pt. will be independent with home exercise program in : 12 weeks  Pt. will have improved quality of sleep: waking less times/night;getting 75-90% of required amount;in 12 weeks  Pt. will be able to walk : 30 minutes;1 mile;in 12 weeks  Patient will stand : 30 minutes;in 12 weeks;with less pain;with less difficultty  Patient will sit: 30 minutes;for driving;for watching TV;in 12 weeks      Plan / Patient Education:     Continue with initial plan of care.  Progress with home program as tolerated.     Assess response to prone over pillows he was unable to lay prone today due to pain.  Assess if he has a + crossover SLR.  Assess spine position and assess response to previous treatment.  Try to add in some abdominal isometrics and see if we can go over proper posture.      Subjective:   Pain increases with weightbearing through the right leg.  Constant pain at  L5 and the sacrum on the right side.  Left rotatedL5,4,3 able to correct this with quadriped latissimus stretch and standing raising arm overhead.  - FW bend test.      Current pain 7/10 pain  Walking in today 7/10 pain  Best the past week 2-3/10 pain  He was moving around standing and sitting.  The patient reports that he has pain and numbness on the right lateral leg but it isn't going down as far. About 1/3 the way down the leg.  Comes and goes.  Worse when laying down.      In the AM have to rub it down have to rub legs and hip at night to be able to fall asleep.  Right knee bent then less pain      Objective:    Constant pain at L5 and the sacrum on the right side.  Left rotatedL5,4,3 able to correct this with quadriped latissimus stretch and standing raising arm overhead.  - FW bend test.  Trialed Edith approach with and without pillows with and without a shift prone and also standing and unable to get a decrease in pain with extension consistently.  When doing NISSA I was able to get a temporary decrease in his pain to a 2/10 and just in the central low back and hip no symptoms in the thigh.  The patient was able to get the lumbar spine to straighten out after doing a quadriped latissimus stretch and just stretching his arm overhead.  The patient was chrystal to advance to adherent nerve root stretch, slump slider caused increased symptoms, he was lian to progress to standing abdominal and gluteal isometrics and supine stabilization.           Treatment Today     TREATMENT MINUTES COMMENTS   Evaluation     Self-care/ Home management     Manual therapy     Neuromuscular Re-education     Therapeutic Activity     Therapeutic Exercises 55 Trialed Edith approach with and without pillows with and without a shift prone and also standing and unable to get a decrease in pain with extension consistently.  When doing NISSA I was able to get a temporary decrease in his pain to a 2/10 and just in the central low back and hip  no symptoms in the thigh.  The patient was able to get the lumbar spine to straighten out after doing a quadriped latissimus stretch and just stretching his arm overhead.  The patient was chrystal to advance to adherent nerve root stretch, slump slider caused increased symptoms, he was lian to progress to standing abdominal and gluteal isometrics and supine stabilization.       Gait training     Modality__________________                Total 55    Blank areas are intentional and mean the treatment did not include these items.       Jie Chacon PT  5/16/2017

## 2021-06-10 NOTE — PROGRESS NOTES
[facility] Daily Progress     Patient Name: Olu Osuna  Date: 5/9/2017  Visit #: 3  PTA visit #:  0  Referral Diagnosis: lumbar radiculopathy  Referring provider: Yusuf Dey MD  Visit Diagnosis:     ICD-10-CM    1. Right leg pain M79.604    2. Lumbar radiculopathy M54.16    3. Acute right lumbar radiculopathy M54.16          Assessment:   MET to correct FRS left L4 and ERS right L4.  STM, MFR to right side of the sacrum, neural gliding to sacrospinous ligaments and sacrotuberous ligaments.  Compression and decompression to sacral base and illium bilaterally.  Dural mobilization from rectus capitus posterior minor to sacral base.  Right superior sacral shear better after working on the right sacral base and lateral side of the sacrum and the neural gliding sacrotuberous and sacrospinous ligaments.        HEP/POC compliance is  fair .    Goal Status:  Pt. will be independent with home exercise program in : 12 weeks  Pt. will have improved quality of sleep: waking less times/night;getting 75-90% of required amount;in 12 weeks  Pt. will be able to walk : 30 minutes;1 mile;in 12 weeks  Patient will stand : 30 minutes;in 12 weeks;with less pain;with less difficultty  Patient will sit: 30 minutes;for driving;for watching TV;in 12 weeks      Plan / Patient Education:     Continue with initial plan of care.  Progress with home program as tolerated.     Assess response to prone over pillows he was unable to lay prone today due to pain.  Assess if he has a + crossover SLR.  Assess spine position and assess response to previous treatment.  Try to add in some abdominal isometrics and see if we can go over proper posture.      Subjective:   Went for a shot  April 28.  Slightly better.  The patient reports that his pain is in general 7/10 pain but it was a 9/10 pain  In the AM have to rub it down have to rub legs and hip at night to be able to fall asleep.  Right knee bent then less pain  Left  rotatedL4,3,2 neutral l1  FRS left and ERS right L4    Pain Ratin  Pain 4/10 after treatment.      Objective:     Left rotatedL4,3,2 neutral l1  FRS left and ERS right L4    MET to correct FRS left L4 and ERS right L4.  STM, MFR to right side of the sacrum, neural gliding to sacrospinous ligaments and sacrotuberous ligaments.  Compression and decompression to sacral base and illium bilaterally.  Dural mobilization from rectus capitus posterior minor to sacral base.    Treatment Today     TREATMENT MINUTES COMMENTS   Evaluation     Self-care/ Home management     Manual therapy 25 MET to correct FRS left L4 and ERS right L4.  STM, MFR to right side of the sacrum, neural gliding to sacrospinous ligaments and sacrotuberous ligaments.  Compression and decompression to sacral base and illium bilaterally.  Dural mobilization from rectus capitus posterior minor to sacral base.     Neuromuscular Re-education 13 kinesio tape I tape to bilateral low back and sacrum with a stabilizing I tape at L4 and star tape at the sacrum. y tape to right piriformis and I tape in the middle of the y tape   Therapeutic Activity     Therapeutic Exercises 5 trialed prone lying but with 2 pillows and is able to tolerate for a short while.  The patient is to try to IR the hip and massage his buttock as needed   Gait training     Modality__________________                Total 43    Blank areas are intentional and mean the treatment did not include these items.       Jie Chacon PT  2017
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
## 2021-06-11 NOTE — PROGRESS NOTES
[facility] Discharge Summary  Patient Name: Olu Osuna  Date: 7/10/2017  Referral Diagnosis: Lumbar radiculopathy  Referring provider: Yusuf Dey MD  Visit Diagnosis:   1. Right leg pain     2. Lumbar radiculopathy         Goals:  Pt. will be independent with home exercise program in : 12 weeks  Pt. will have improved quality of sleep: waking less times/night;getting 75-90% of required amount;in 12 weeks  Pt. will be able to walk : 30 minutes;1 mile;in 12 weeks  Patient will stand : 30 minutes;in 12 weeks;with less pain;with less difficultty  Patient will sit: 30 minutes;for driving;for watching TV;in 12 weeks    Patient was seen for 5 visits for physical therapy of R leg pain from 4/5/17 to 6/12/17 with no appointments.   The patient attended therapy initially, but did not finish the therapy sessions prescribed.  Goals were not fully achieved. Explanation for goals not achieved: Patient did not return to measure progress, but returned to PCP for further instruction as therapy treatment was not benefitting him.  The patient discontinued therapy, did not return.    Therapy will be discontinued at this time.  The patient will need a new referral to resume physical therapy treatment. Please see below for patient's current status.    Thank you for your referral.  Gail Gallagher, PT, DPT  7/10/2017   1:09 PM      [facility] Daily Progress     Patient Name: Olu Osuna  Date: 6/12/2017  Visit #: 5  Referral Diagnosis: lumbar radiculopathy  Referring provider: Yusuf Dey MD  Visit Diagnosis:     ICD-10-CM    1. Right leg pain M79.604    2. Lumbar radiculopathy M54.16          Assessment:     Today patient presented with no change in pain or symptoms since initial evaluation. Patient continues to demonstrate increased pain with forward flexion, ambulation and weight bearing activities 2/2 lumbar disc herniation. Patient demonstrates decreased flexibility and tissue  extensibility of lumbar spine muscles R>L, with sharp shooting pain reproduction during movement. Patient educated on importance of exercise and consistency in rehabilitation, patient demonstrated and verbalized understanding. Patient expressed concern about not getting any better, and is going to try to make an earlier appointment with his PCP prior to the scheduled 7/6/17 date.       HEP/POC compliance is  fair .    Goal Status:  Pt. will be independent with home exercise program in : 12 weeks  Pt. will have improved quality of sleep: waking less times/night;getting 75-90% of required amount;in 12 weeks  Pt. will be able to walk : 30 minutes;1 mile;in 12 weeks  Patient will stand : 30 minutes;in 12 weeks;with less pain;with less difficultty  Patient will sit: 30 minutes;for driving;for watching TV;in 12 weeks      Plan / Patient Education:     Continue with initial plan of care.  Progress with home program as tolerated.     Assess response to prone over pillows he was unable to lay prone today due to pain.  Assess if he has a + crossover SLR.  Try to add in some abdominal isometrics and see if we can go over proper posture.      Subjective:     Patient reports nothing has changed, at certain times it doesn't bother him. It still hurts him the most with walking and when he wakes up in the morning. Rubbing the area sometimes helps. 7/10 pain today, constant pain in low back and hip. Cortizone shot didn't do much except for initially.     Current pain 7/10 pain  Walking in today 7/10 pain  Best the past week 2-3/10 pain  The patient reports that he has pain and numbness on the right lateral leg but it isn't going down as far. About 1/3 the way down the leg.  Comes and goes.  Worse when laying down.      Objective:     Constant pain at L5 and the sacrum on the right side.  Left rotatedL5,4,3 able to correct this with quadruped latissimus stretch and standing raising arm overhead.  - FW bend test.    Exercises below  represent all exercise the patient has done in the clinic and HEP.   Please see today's exercise flow-sheet for specifics of today's exercise performance.     Exercises:  Exercise #1: wall circles and left lateral shift and drop hips  Comment #1: 10 reps  Exercise #2: use a ball or 2 tennis balls in a sock to get the low ack to relax   Comment #2: hold 3 minutes  Exercise #3: prone lying over 2-3 pillows as tolerated  Exercise #4: supine bent knee hip IR and ER with massage to the buttock  Comment #4: 30 seconds or as tolerated  Exercise #5: quadriped latissimus stretch  Comment #5: 30 seconds with cues not to bounce patient to do 3 reps each side  Exercise #6: standing raising arm up to stretch lateral side as tolerated as the patient reports that he has a previous shoulder issue  Comment #6: as able  Exercise #7: standing abdominal and gluteal isometrics  Comment #7: 5-20 seconds 5-10 reps  Exercise #8: supine stabilization  Comment #8: tighten abdominals 1st and loosen abdoinals last.  DF feet tighten quads , scapula, arms, and push head into the bed  Exercise #9: NISSA  Comment #9: as much as able to help with decreasing his pain.  Exercise #10: adherent nerve root stretch  Comment #10: 30 seconds 3 reps 1-2 times per day.  Exercise #11: supine bridge with hip adduction - 10 reps x 2  Comment #11: seated hamstring stretch - hold 30 sec x 2 - can do add/ITB bias  Exercise #12: prone on elbows - hold 5 sec x 10 reps       Treatment Today     TREATMENT MINUTES COMMENTS   Evaluation     Self-care/ Home management 5 Patient education   Manual therapy 24 MFR/STM to bilateral lumbar paraspinals and QL in prone R>L - reported increased tenderness that decreased after treatment  MFR/STM to R piriformis and ITB in sidelying  Manual hamstring stretch bilaterally - 30 sec hold x 3   Neuromuscular Re-education     Therapeutic Activity     Therapeutic Exercises 15 See flowsheet   Gait training     Modality__________________                 Total 44 Limited time due to patient late arrival   Blank areas are intentional and mean the treatment did not include these items.       Gail Gallagher PT  6/12/2017
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
## 2021-06-16 NOTE — TELEPHONE ENCOUNTER
Telephone Encounter by Lakisha Finley at 9/5/2019  4:11 PM     Author: Lakisha Finley Service: -- Author Type: --    Filed: 9/5/2019  4:11 PM Encounter Date: 9/3/2019 Status: Signed    : Lakisha Finley       PRIOR AUTHORIZATION DENIED    Denial Rational: Benefit exclusion. Part D does not cover drugs used to treat sexual dysfunction
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
## 2021-06-16 NOTE — TELEPHONE ENCOUNTER
Telephone Encounter by Jennifer Martinez at 4/5/2019  3:26 PM     Author: Jennifer Martinez Service: -- Author Type: --    Filed: 4/5/2019  3:26 PM Encounter Date: 4/3/2019 Status: Signed    : Jennifer Martinez APPROVED:    Approval start date:04/05/2019  Approval end date:04/05/2020    Pharmacy has been notified of approval and will contact patient when medication is ready for pickup.

## 2021-06-17 NOTE — PATIENT INSTRUCTIONS - HE
Patient Instructions by Yusuf Dey MD at 1/11/2019 10:00 AM     Author: Yusuf Dey MD Service: -- Author Type: Physician    Filed: 1/11/2019 10:39 AM Encounter Date: 1/11/2019 Status: Signed    : Yusuf Dey MD (Physician)         Patient Education   Understanding USDA MyPlate  The USDA (US Department of Agriculture) has guidelines to help you make healthy food choices. These are called MyPlate. MyPlate shows the food groups that make up healthy meals using the image of a place setting. Before you eat, think about the healthiest choices for what to put onto your plate or into your cup or bowl. To learn more about building a healthy plate, visit www.choosemyplate.gov.       The Food Groups    Fruits: Any fruit or 100% fruit juice counts as part of the Fruit Group. Fruits may be fresh, canned, frozen, or dried, and may be whole, cut-up, or pureed. Make half your plate fruits and vegetables.    Vegetables: Any vegetable or 100% vegetable juice counts as a member of the Vegetable Group. Vegetables may be fresh, frozen, canned, or dried. They can be served raw or cooked and may be whole, cut-up, or mashed. Make half your plate fruits and vegetables.     Grains: All foods made from grains are part of the Grains Group. These include wheat, rice, oats, cornmeal, and barley such as bread, pasta, oatmeal, cereal, tortillas, and grits. Grains should be no more than a quarter of your plate. At least half of your grains should be whole grains.    Protein: This group includes meat, poultry, seafood, beans and peas, eggs, processed soy products (like tofu), nuts (including nut butters), and seeds. Make protein choices no more than a quarter of your plate. Meat and poultry choices should be lean or low fat.    Dairy: All fluid milk products and foods made from milk that contain calcium, like yogurt and cheese are part of the Dairy Group. (Foods that have little calcium, such as cream, butter,  and cream cheese, are not part of the group.) Most dairy choices should be low-fat or fat-free.    Oils: These are fats that are liquid at room temperature. They include canola, corn, olive, soybean, and sunflower oil. Foods that are mainly oil include mayonnaise, certain salad dressings, and soft margarines. You should have only 5 to 7 teaspoons of oils a day. You probably already get this much from the food you eat.  Use [employer] to Help Build Your Meals  The [employer] can help you plan and track your meals and activity. You can look up individual foods to see or compare their nutritional value. You can get guidelines for what and how much you should eat. You can compare your food choices. And you can assess personal physical activities and see ways you can improve. Go to www.[employer].gov/supertracker/.    6424-0979 [employer]. [address] [place] [address], [place] [address]. All rights reserved. This information is not intended as a substitute for professional medical care. Always follow your healthcare professional's instructions.

## 2021-06-18 NOTE — LETTER
Letter by Yusuf Dey MD at      Author: Yusuf Dey MD Service: -- Author Type: --    Filed:  Encounter Date: 1/13/2019 Status: (Other)       Olu Osuna  [address]  [place] [address]             January 13, 2019         Dear Rock,    Below are the results from your recent visit:    Resulted Orders   Microalbumin, Random Urine   Result Value Ref Range    Microalbumin, Random Urine 0.88 0.00 - 1.99 mg/dL    Creatinine, Urine 126.5 mg/dL    Microalbumin/Creatinine Ratio Random Urine 7.0 <=19.9 mg/g    Narrative    Microalbumin, Random Urine  <2.0 mg/dL . . . . . . . . Normal  3.0-30.0 mg/dL . . . . . . Microalbuminuria  >30.0 mg/dL . . . . . .  . Clinical Proteinuria    Microalbumin/Creatinine Ratio, Random Urine  <20 mg/g . . . . .. . . . Normal   mg/g . . . . . . . Microalbuminuria  >300 mg/g . . . . . . . . Clinical Proteinuria     Glycosylated Hemoglobin A1c   Result Value Ref Range    Hemoglobin A1c 5.4 3.5 - 6.0 %   Lipid Cascade   Result Value Ref Range    Cholesterol 228 (H) <=199 mg/dL    Triglycerides 89 <=149 mg/dL    HDL Cholesterol 73 >=40 mg/dL    LDL Calculated 137 (H) <=129 mg/dL    Patient Fasting > 8hrs? Yes    Comprehensive Metabolic Panel   Result Value Ref Range    Sodium 138 136 - 145 mmol/L    Potassium 3.8 3.5 - 5.0 mmol/L    Chloride 105 98 - 107 mmol/L    CO2 20 (L) 22 - 31 mmol/L    Anion Gap, Calculation 13 5 - 18 mmol/L    Glucose 93 70 - 125 mg/dL    BUN 11 8 - 22 mg/dL    Creatinine 0.71 0.70 - 1.30 mg/dL    GFR MDRD Af Amer >60 >60 mL/min/1.73m2    GFR MDRD Non Af Amer >60 >60 mL/min/1.73m2    Bilirubin, Total 0.4 0.0 - 1.0 mg/dL    Calcium 9.5 8.5 - 10.5 mg/dL    Protein, Total 8.2 (H) 6.0 - 8.0 g/dL    Albumin 4.5 3.5 - 5.0 g/dL    Alkaline Phosphatase 84 45 - 120 U/L    AST 26 0 - 40 U/L    ALT 24 0 - 45 U/L    Narrative    Fasting Glucose reference range is 70-99 mg/dL per  American Diabetes Association (ADA) guidelines.   HM2(CBC w/o  Differential)   Result Value Ref Range    WBC 3.6 (L) 4.0 - 11.0 thou/uL    RBC 4.10 (L) 4.40 - 6.20 mill/uL    Hemoglobin 13.3 (L) 14.0 - 18.0 g/dL    Hematocrit 39.3 (L) 40.0 - 54.0 %    MCV 96 80 - 100 fL    MCH 32.4 27.0 - 34.0 pg    MCHC 33.8 32.0 - 36.0 g/dL    RDW 13.2 11.0 - 14.5 %    Platelets 213 140 - 440 thou/uL    MPV 9.6 8.5 - 12.5 fL   PSA, Annual Screen (Prostatic-Specific Antigen)   Result Value Ref Range    PSA 5.9 (H) 0.0 - 4.5 ng/mL    Narrative    Method is Abbott Prostate-Specific Antigen (PSA)  Standard-WHO 1st International (90:10)       Your PSA is higher than last year and is concerning as it could represent early prostate cancer.  I would like you to schedule an appointment with a urologist and I have sent the referral.  You should be getting a call.    Diabetes is controlled with the A1c of 5.4%.  Your cholesterol is just slightly above goal.  The LDL should be under 130.  Normal kidney and liver studies.  Mild anemia is stable.    Please call with questions or contact us using [facility].    Sincerely,        Electronically signed by Yusuf Dey MD

## 2021-06-19 NOTE — LETTER
Letter by Yusuf Dey MD at      Author: Yusuf Dey MD Service: -- Author Type: --    Filed:  Encounter Date: 7/20/2019 Status: (Other)         Olu Osuna  [address]  [place] [address]             July 20, 2019         Dear Olu,    Below are the results from your recent visit:    Resulted Orders   PSA, Diagnostic (Prostatic-Specific Antigen)   Result Value Ref Range    PSA 7.4 (H) 0.0 - 4.5 ng/mL    Narrative    Method is Abbott Prostate-Specific Antigen (PSA)  Standard-WHO 1st International (90:10)   Glycosylated Hemoglobin A1c   Result Value Ref Range    Hemoglobin A1c 5.9 3.5 - 6.0 %       Diabetes control remains good with the A1c of 5.9%.    Your PSA is higher than last time at my office when it was 5.9.  I am very concerned that you have prostate cancer and this needs to be further addressed.  You should follow-up with urology and get the prostate biopsy completed as recommended.    Please call with questions or contact us using [employer].    Sincerely,        Electronically signed by Yusuf Dey MD
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
## 2021-06-23 NOTE — TELEPHONE ENCOUNTER
----- Message from Yusuf Dey MD sent at 1/13/2019  5:33 PM CST -----  Try calling [facility].  Tell him that his PSA is higher this year at 5.9 and that I would like him to see a urologist as a precaution to make sure that he does not have early prostate cancer.  I am mailing him a letter but he will be getting a call from the urology office or our  before the letter arrives.

## 2021-06-24 NOTE — TELEPHONE ENCOUNTER
Refill Approved    Rx renewed per Medication Renewal Policy. Medication was last renewed on 1/9/18.    Josiah Fry, [facility] Triage/Med Refill 3/7/2019     Requested Prescriptions   Pending Prescriptions Disp Refills     fluticasone (FLONASE) 50 mcg/actuation nasal spray [Pharmacy Med Name: FLUTICASONE PROP 50 MCG SPRAY]  10     Sig: USE 2 SPRAYS IN EACH NOSTRIL ONCE DAILY    Nasal Steroid Refill Protocol Passed - 3/4/2019  1:11 PM       Passed - Patient has had office visit/physical in last 2 years    Last office visit with prescriber/PCP: 12/27/2018 OR same dept: 12/27/2018 Yusuf Dey MD OR same specialty: 12/27/2018 Yusuf Dey MD Last physical: 1/11/2019 Last MTM visit: Visit date not found    Next appt within 3 mo: Visit date not found  Next physical within 3 mo: Visit date not found  Prescriber OR PCP: Yusuf Dey MD  Last diagnosis associated with med order: There are no diagnoses linked to this encounter.   If protocol passes may refill for 12 months if within 3 months of last provider visit (or a total of 15 months).
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
## 2021-06-27 NOTE — PROGRESS NOTES
Progress Notes by Yusuf Dey MD at 1/11/2019 10:00 AM     Author: Yusuf Dey MD Service: -- Author Type: Physician    Filed: 1/11/2019 10:51 AM Encounter Date: 1/11/2019 Status: Signed    : Yusuf Dey MD (Physician)       MALE PREVENTATIVE EXAM    Assessment and Plan:       1. Routine general medical examination at a health care facility  Immunizations are reviewed and he should have Shingrix.  Living will has been discussed.  Smoking cessation discussed.  Uses alcohol in moderation.  Regular exercise discussed.  Up to date with colonoscopies and this should be repeated in 2024.  Prostate exam is normal and I will check a PSA for prostate cancer screening.    He sees his ophthalmologist regularly and gets glaucoma screening.  Skin exam performed and recommending regular use of sunblock.   AAA screening at age 64.      2. Esophageal dysphagia  Ongoing symptoms of painful swallowing and dysphagia despite omeprazole 20 mg every morning.  Will arrange for EGD.  Increased risk for malignancy with chronic tobacco use  - Ambulatory Referral for Upper GI Endoscopy    3. Type 2 diabetes mellitus without complication, without long-term current use of insulin (H)  Diabetes sounding under control.  Recheck A1c.  Continue diabetic annual eye exam.  No peripheral neuropathy on diabetic foot exam.  - Microalbumin, Random Urine  - Glycosylated Hemoglobin A1c    If cholesterol is elevated, recommend statin especially with calculated 10-year ASCVD risk of 30%    4. Elevated PSA  Recheck PSA and if further elevated, refer to urology    5. Tobacco abuse  Lung Cancer Screening pre-scan counseling Visit    The patient fits the risk profile of patients who benefit from this screening:  -The patient is >55 years old and <80 years old  -The patient has 40 pack year history (over 30)  -The patient has smoked within the past 15 years  -The patient has no medical comorbidity severe enough that it would  cause mortality prior to mortality due to the lung cancer attempting to be detected.    Discussion with patient regarding the harms associated with LDCT screening include false-negative and false-positive results, incidental findings, overdiagnosis, and radiation exposure were reviewed at length.   The patient understands that pursuing this screening test may result in a biopsy that was not necessary. It may also produce added stress over a nodule that is likely not cancer.    Of 100 patients who get screening, 25 will have a positive scan. Of those 25, only 1 will have cancer.  Overdiagnosis is estimated at 10% of patients-- they would not have been detected in the patient's lifetime without screening. Less than 1% of patients likely had death related to radiation exposure increase.   Average low-dose CT associated with 0.61 to 1.5 mSv. Annual background radiation exposure in the [place] averages 2.4 mSv; mammogram is 0.7mSv.    The benefits are reduction in risk of death from lung cancer. The number needed to treat is 320 (for every 320 patients who undergo screening, 1 patient will have a benefit in mortality from early detection from the screening).    Undergoing this screening implies willingness to pursue further potentially invasive testing to discover potential cancer.    All questions were answered.    The patient was counseled regarding smoking cessation and its risk for lung cancer.    - CT Low Dose Lung Screening Chest; Future    6. Right lumbar radiculopathy  Continues to recover from lumbar decompression fusion performed in October.  He will follow-up with his surgeon    7. Primary osteoarthritis of both knees  Both knees have been replaced    8. Mild major depression (H)  PHQ 9 score is 9.  He has not been interested in taking medication.    9. Chronic hepatitis C without hepatic coma (H)  Successfully treated with Harvoni.  No evidence for recurrence.  Check appropriate labs.  - Comprehensive  Metabolic Panel  - HM2(CBC w/o Differential)    10. Non-seasonal allergic rhinitis, unspecified trigger  Using nonsedating antihistamine daily    11. Dermatitis  Persistent itching in scalp with alopecia.  He has an appointment with dermatology scheduled.  Fluocinolone has not been helpful    12. Screening for prostate cancer    - PSA, Annual Screen (Prostatic-Specific Antigen)    13. Encounter for screening for lipoid disorders    - Lipid Cascade    14. Paresthesia of right foot  Normal vibratory sensation.  I suspect related to back problems and pinched nerves    Over 15 minutes was spent addressing these new or worsening medical problems beyond time spent performing annual physical with over 50% of the time spent counseling and coordination of care        Next follow up:  Return in 6 months (on 7/11/2019) for Recheck.    Immunization Review  Adult Imm Review: No immunizations due today      I discussed the following with the patient:   Adult Healthy Living: Importance of regular exercise  Healthy nutrition        Subjective:   Chief Complaint: Olu Osuna is an 63 y.o. male here for a preventative health visit.     HPI: Ongoing dysphagia with burning sensation when swallowing food only partially better with omeprazole.  Last EGD over 4 years ago.  Started on omeprazole 20 mg every morning which has been only partially helpful.  No abdominal pain.  No change in bowel movements.  No melena or hematochezia.  No unexpected weight loss.  Appetite remains good.  No night sweats and no fevers or chills.    He has ongoing itching in his left scalp and problems with hair loss.  Fluocinolone does not seem to be helpful.    Underwent lumbar decompression and fusion and having pain across low back and buttock region but pain radiating down his leg has resolved.      Healthy Habits  Are you taking a daily aspirin? No  Do you typically exercising at least 40 min, 3-4 times per week?  NO  Do you usually eat at least 4  "servings of fruit and vegetables a day, include whole grains and fiber and avoid regularly eating high fat foods? NO  Have you had an eye exam in the past two years? Yes  Do you see a dentist twice per year? Yes  Do you have any concerns regarding sleep? No    Safety Screen  If you own firearms, are they secured in a locked gun cabinet or with trigger locks? The patient does not own any firearms  Do you feel you are safe where you are living?: Yes (1/11/2019  9:57 AM)  Do you feel you are safe in your relationship(s)?: Yes (1/11/2019  9:57 AM)      Review of Systems:  Please see above.  The rest of the review of systems are negative for all systems.     Cancer Screening       Status Date      COLONOSCOPY Next Due 8/6/2024      Done 8/6/2014 COLONOSCOPY EXTERNAL RESULT     Patient has more history with this topic...              History     Reviewed By Date/Time Sections Reviewed    Yusuf Dey MD 1/11/2019 10:05 AM Medical, Surgical, Tobacco, Alcohol, Drug Use, Sexual Activity, Family, Social Documentation    Heavenly Caldera CMA 1/11/2019  9:57 AM Tobacco, Alcohol, Drug Use, Sexual Activity            Objective:   Vital Signs:   Visit Vitals  /82 (Patient Site: Left Arm, Patient Position: Sitting, Cuff Size: Adult Large)   Pulse 89   Ht 5' 10\" (1.778 m)   Wt 155 lb (70.3 kg)   SpO2 98%   BMI 22.24 kg/m           PHYSICAL EXAM  EYES: Eyelids, conjunctiva, and sclera were normal. Pupils were normal. Cornea, iris, and lens were normal bilaterally.  HEAD, EARS, NOSE, MOUTH, AND THROAT: Head and face were normal. Nose appearance was normal and there was no discharge. Oropharynx was normal.  NECK: Neck appearance was normal. There were no neck masses and the thyroid was not enlarged and no nodules are felt.  No lymphadenopathy.  RESPIRATORY: Breathing pattern was normal and the chest moved symmetrically.  Percussion/auscultatory percussion was normal.  Lung sounds were normal and there were no rales or " wheezes.  CARDIOVASCULAR: Heart rate and rhythm were normal.  S1 and S2 were normal and there were no extra sounds or murmurs. Peripheral pulses in arms and legs were normal.  Jugular venous pressure was normal.  There was no peripheral edema.  No carotid bruits.  GASTROINTESTINAL: The abdomen was normal in contour.  Bowel sounds were present.   Palpation detected no tenderness, mass, or enlarged organs.   RECTAL/PROSTATE: No external lesions.  Sphincter tone normal.  No palpable rectal lesions.  Prostate large but smooth, nontender without nodules  MUSCULOSKELETAL: Skeletal configuration was normal and muscle mass was normal for age. Joint appearance was overall normal.  LYMPHATIC: There were no enlarged nodes.  SKIN/HAIR/NAILS: Skin color was normal.  Hair and nails were normal.There were no skin lesions.  NEUROLOGIC: The patient was alert and oriented to person, place, time, and circumstance. Speech was normal. Cranial nerves were normal. Motor strength was normal for age. The patient was normally coordinated.  Sensation intact.   Diabetic foot exam: Good pedal pulses.  Normal vibratory and pinprick sensation and no other abnormalities  PSYCHIATRIC:  Mood and affect were normal and the patient had normal recent and remote memory. The patient's judgment and insight were normal.  PHQ 9 score is 9    The 10-year ASCVD risk score (Snow LOREN Jr., et al., 2013) is: 30.2%    Values used to calculate the score:      Age: 63 years      Sex: Male      Is Non- : Yes      Diabetic: Yes      Tobacco smoker: Yes      Systolic Blood Pressure: 140 mmHg      Is BP treated: No      HDL Cholesterol: 76 mg/dL      Total Cholesterol: 234 mg/dL         Medication List           Accurate as of 1/11/19 10:50 AM. If you have any questions, ask your nurse or doctor.               START taking these medications    loratadine 10 mg tablet  Also known as:  CLARITIN  INSTRUCTIONS:  Take 1 tablet (10 mg total) by mouth  daily.  Started by:  Yusuf Dey MD           CONTINUE taking these medications    blood glucose test strips  INSTRUCTIONS:  Check once daily Dispense brand per patient's insurance at pharmacy discretion.        blood-glucose meter Misc  INSTRUCTIONS:  Check once daily        celecoxib 200 MG capsule  Also known as:  CeleBREX  INSTRUCTIONS:  TAKE 1 CAPSULE DAILY.        * SPECTRAVITE ADULT 50 PLUS 0.4-300-250 mg-mcg-mcg tablet  INSTRUCTIONS:  Take 1 tablet by mouth daily.  Generic drug:  multivit-min-FA-lycopen-lutein        * CENTRUM SILVER MEN ORAL  INSTRUCTIONS:  Take by mouth daily.        fluocinolone 0.01 % external solution  Also known as:  SYNALAR  INSTRUCTIONS:  Apply to scalp daily for 2 weeks        fluticasone 50 mcg/actuation nasal spray  Also known as:  FLONASE  INSTRUCTIONS:  Use 2 sprays in each nostril once daily        nicotine polacrilex 4 MG gum  Also known as:  NICORETTE  INSTRUCTIONS:  Apply 1 each (4 mg total) to the mouth or throat as needed for smoking cessation.        omeprazole 20 MG capsule  Also known as:  PriLOSEC  INSTRUCTIONS:  Take 1 capsule (20 mg total) by mouth daily before breakfast.        ONETOUCH DELICA LANCETS 30 gauge Misc  INSTRUCTIONS:  CHECK ONCE DAILY  Generic drug:  lancets        triamcinolone 0.1 % cream  Also known as:  KENALOG  INSTRUCTIONS:  Apply topically 2 (two) times a day.            * This list has 2 medication(s) that are the same as other medications prescribed for you. Read the directions carefully, and ask your doctor or other care provider to review them with you.               Where to Get Your Medications      Information about where to get these medications is not yet available    Ask your nurse or doctor about these medications    loratadine 10 mg tablet         Additional Screenings Completed Today:

## 2021-07-03 NOTE — ADDENDUM NOTE
Addendum Note by Adelaide Flores LPN at 4/20/2017  1:54 PM     Author: Adelaide Flores LPN Service: -- Author Type: Licensed Nurse    Filed: 4/20/2017  1:54 PM Encounter Date: 4/20/2017 Status: Signed    : Adelaide Flores LPN (Licensed Nurse)    Addended by: ADELAIDE FLORES on: 4/20/2017 01:54 PM        Modules accepted: Orders

## (undated) DEVICE — DRAIN JACKSON PRATT CHANNEL 10FR RND SIL W/TROCAR JP-2227

## (undated) DEVICE — GLOVE PROTEXIS POWDER FREE 8.0 ORTHOPEDIC 2D73ET80

## (undated) DEVICE — CUSHION INSERT LG PRONE VIEW JACKSON TABLE

## (undated) DEVICE — GLOVE PROTEXIS POWDER FREE 6.5 ORTHOPEDIC 2D73ET65

## (undated) DEVICE — TUBING SUCTION SOFT 20'X3/16" 0036570

## (undated) DEVICE — ESU GROUND PAD UNIVERSAL W/O CORD

## (undated) DEVICE — SOL WATER IRRIG 1000ML BOTTLE 2F7114

## (undated) DEVICE — SU VICRYL 1 CT 36" J959H

## (undated) DEVICE — GLOVE PROTEXIS BLUE W/NEU-THERA 7.5  2D73EB75

## (undated) DEVICE — GLOVE PROTEXIS BLUE W/NEU-THERA 6.5  2D73EB65

## (undated) DEVICE — DRAIN JACKSON PRATT RESERVOIR 100ML SU130-1305

## (undated) DEVICE — DRAPE SHEET REV FOLD 3/4 9349

## (undated) DEVICE — ESU ELEC BLADE 4" COATED

## (undated) DEVICE — DRSG KERLIX 4 1/2"X4YDS ROLL 6715

## (undated) DEVICE — CATH TRAY FOLEY COUDE SURESTEP 16FR W/URNE MTR STLK A304716A

## (undated) DEVICE — LINEN TOWEL PACK X5 5464

## (undated) DEVICE — PACK SET-UP STD 9102

## (undated) DEVICE — BONE WAX 2.5GM W31G

## (undated) DEVICE — DRAPE LAP W/ARMBOARD 29410

## (undated) DEVICE — SPONGE RAY-TEC 4X8" 7318

## (undated) DEVICE — PAD POSITIONING KIT CHEST SHEARGUARD DISP LF 5844-13

## (undated) DEVICE — SPONGE SURGIFOAM 50

## (undated) DEVICE — SU VICRYL 3-0 PS-1 18" UND J683

## (undated) DEVICE — RX SURGIFLO HEMOSTATIC MATRIX 10ML 199102S

## (undated) DEVICE — MANIFOLD NEPTUNE 4 PORT 700-20

## (undated) DEVICE — PREP CHLORAPREP 26ML TINTED ORANGE  260815

## (undated) DEVICE — ESU PENCIL W/HOLSTER E2350H

## (undated) DEVICE — DRAPE STERI TOWEL LG 1010

## (undated) DEVICE — PACK LARGE SPINE SNE15LSFSE

## (undated) DEVICE — SU STRATAFIX PDS PLUS 0 CT 45CM SXPP1A406

## (undated) DEVICE — SU VICRYL 2-0 CT-1 27" J339H

## (undated) DEVICE — BUR ROUND 5MM CARBIDE XLONG 5093-230

## (undated) DEVICE — SU VICRYL 1 MO-4 18" J702D

## (undated) RX ORDER — PROPOFOL 10 MG/ML
INJECTION, EMULSION INTRAVENOUS
Status: DISPENSED
Start: 2018-10-08

## (undated) RX ORDER — HYDROMORPHONE HYDROCHLORIDE 1 MG/ML
INJECTION, SOLUTION INTRAMUSCULAR; INTRAVENOUS; SUBCUTANEOUS
Status: DISPENSED
Start: 2018-10-08

## (undated) RX ORDER — CEFAZOLIN SODIUM 2 G/100ML
INJECTION, SOLUTION INTRAVENOUS
Status: DISPENSED
Start: 2018-10-08

## (undated) RX ORDER — DEXAMETHASONE SODIUM PHOSPHATE 4 MG/ML
INJECTION, SOLUTION INTRA-ARTICULAR; INTRALESIONAL; INTRAMUSCULAR; INTRAVENOUS; SOFT TISSUE
Status: DISPENSED
Start: 2018-10-08

## (undated) RX ORDER — FENTANYL CITRATE 50 UG/ML
INJECTION, SOLUTION INTRAMUSCULAR; INTRAVENOUS
Status: DISPENSED
Start: 2018-10-08

## (undated) RX ORDER — LIDOCAINE HYDROCHLORIDE 10 MG/ML
INJECTION, SOLUTION EPIDURAL; INFILTRATION; INTRACAUDAL; PERINEURAL
Status: DISPENSED
Start: 2018-10-08

## (undated) RX ORDER — ONDANSETRON 2 MG/ML
INJECTION INTRAMUSCULAR; INTRAVENOUS
Status: DISPENSED
Start: 2018-10-08

## (undated) RX ORDER — CLINDAMYCIN PHOSPHATE 900 MG/50ML
INJECTION, SOLUTION INTRAVENOUS
Status: DISPENSED
Start: 2018-10-08